# Patient Record
Sex: MALE | NOT HISPANIC OR LATINO | Employment: FULL TIME | URBAN - METROPOLITAN AREA
[De-identification: names, ages, dates, MRNs, and addresses within clinical notes are randomized per-mention and may not be internally consistent; named-entity substitution may affect disease eponyms.]

---

## 2023-03-07 DIAGNOSIS — E11.9 TYPE 2 DIABETES MELLITUS WITHOUT COMPLICATION, WITHOUT LONG-TERM CURRENT USE OF INSULIN (MULTI): Primary | ICD-10-CM

## 2023-03-12 PROBLEM — E11.9 DIABETES MELLITUS TYPE 2 WITHOUT RETINOPATHY (MULTI): Status: ACTIVE | Noted: 2023-03-12

## 2023-03-12 PROBLEM — H52.201 MYOPIA OF RIGHT EYE WITH ASTIGMATISM: Status: ACTIVE | Noted: 2023-03-12

## 2023-03-12 PROBLEM — E66.01 SEVERE OBESITY (BMI 35.0-39.9) WITH COMORBIDITY (MULTI): Status: ACTIVE | Noted: 2023-03-12

## 2023-03-12 PROBLEM — E55.9 VITAMIN D DEFICIENCY: Status: ACTIVE | Noted: 2023-03-12

## 2023-03-12 PROBLEM — F32.A ANXIETY AND DEPRESSION: Status: ACTIVE | Noted: 2023-03-12

## 2023-03-12 PROBLEM — H57.12 LEFT EYE PAIN: Status: ACTIVE | Noted: 2023-03-12

## 2023-03-12 PROBLEM — R07.89 ATYPICAL CHEST PAIN: Status: ACTIVE | Noted: 2023-03-12

## 2023-03-12 PROBLEM — H52.11 MYOPIA OF RIGHT EYE WITH ASTIGMATISM: Status: ACTIVE | Noted: 2023-03-12

## 2023-03-12 PROBLEM — L73.0 ACNE KELOIDALIS NUCHAE: Status: ACTIVE | Noted: 2023-03-12

## 2023-03-12 PROBLEM — R05.9 COUGH: Status: ACTIVE | Noted: 2023-03-12

## 2023-03-12 PROBLEM — H44.522 PHTHISIS BULBI OF LEFT EYE: Status: ACTIVE | Noted: 2023-03-12

## 2023-03-12 PROBLEM — F41.9 ANXIETY AND DEPRESSION: Status: ACTIVE | Noted: 2023-03-12

## 2023-03-12 PROBLEM — H17.9 LEFT CORNEA SCAR: Status: ACTIVE | Noted: 2023-03-12

## 2023-03-12 PROBLEM — J45.909 ASTHMA (HHS-HCC): Status: ACTIVE | Noted: 2023-03-12

## 2023-03-12 PROBLEM — Z86.69 HISTORY OF RETINAL DETACHMENT: Status: ACTIVE | Noted: 2023-03-12

## 2023-03-12 RX ORDER — BLOOD SUGAR DIAGNOSTIC
STRIP MISCELLANEOUS
COMMUNITY
End: 2023-04-28 | Stop reason: SDUPTHER

## 2023-03-12 RX ORDER — ALPRAZOLAM 0.5 MG/1
TABLET ORAL
COMMUNITY
Start: 2021-11-01 | End: 2023-06-16

## 2023-03-12 RX ORDER — METFORMIN HYDROCHLORIDE 500 MG/1
TABLET, EXTENDED RELEASE ORAL
COMMUNITY
End: 2023-09-22 | Stop reason: SDUPTHER

## 2023-03-12 RX ORDER — ERGOCALCIFEROL 1.25 MG/1
CAPSULE ORAL
COMMUNITY
Start: 2022-09-15 | End: 2023-12-12 | Stop reason: ALTCHOICE

## 2023-03-12 RX ORDER — ALBUTEROL SULFATE 90 UG/1
AEROSOL, METERED RESPIRATORY (INHALATION)
COMMUNITY
Start: 2022-08-03 | End: 2023-06-16

## 2023-03-12 RX ORDER — BLOOD-GLUCOSE CONTROL, NORMAL
EACH MISCELLANEOUS
COMMUNITY

## 2023-03-12 RX ORDER — PAROXETINE HYDROCHLORIDE 40 MG/1
40 TABLET, FILM COATED ORAL EVERY MORNING
COMMUNITY
Start: 2023-03-01 | End: 2023-06-07 | Stop reason: ENTERED-IN-ERROR

## 2023-03-12 RX ORDER — DULAGLUTIDE 0.75 MG/.5ML
INJECTION, SOLUTION SUBCUTANEOUS
COMMUNITY
End: 2023-04-13 | Stop reason: DRUGHIGH

## 2023-03-22 ENCOUNTER — TELEMEDICINE (OUTPATIENT)
Dept: PHARMACY | Facility: HOSPITAL | Age: 28
End: 2023-03-22
Payer: COMMERCIAL

## 2023-03-22 DIAGNOSIS — E11.9 TYPE 2 DIABETES MELLITUS WITHOUT COMPLICATION, WITHOUT LONG-TERM CURRENT USE OF INSULIN (MULTI): ICD-10-CM

## 2023-03-22 NOTE — PROGRESS NOTES
Subjective   Patient ID: Erasto Botello is a 27 y.o. male who presents for Diabetes.  Diabetes  He presents for his follow-up diabetic visit. He has type 2 diabetes mellitus. Current diabetic treatment includes oral agent (dual therapy) (Trulicity). An ACE inhibitor/angiotensin II receptor blocker is not being taken.       Referring Provider: MARCO A Dash-JENNIE     Objective     There were no vitals taken for this visit.     LAB  Lab Results   Component Value Date    BILITOT 0.4 07/21/2022    CALCIUM 9.9 07/21/2022    CO2 25 07/21/2022     07/21/2022    CREATININE 0.97 07/21/2022    GLUCOSE 98 07/21/2022    ALKPHOS 73 07/21/2022    K 4.1 07/21/2022    PROT 7.6 07/21/2022     07/21/2022    AST 27 07/21/2022    ALT 39 07/21/2022    BUN 13 07/21/2022    ANIONGAP 13 07/21/2022    MG 1.84 04/02/2021    PHOS 3.9 04/02/2021    ALBUMIN 4.7 07/21/2022    GFRMALE >90 07/21/2022     Lab Results   Component Value Date    TRIG 109 02/13/2019    CHOL 120 04/14/2021    HDL 26.4 (A) 04/14/2021     Lab Results   Component Value Date    HGBA1C 8.1 (A) 06/07/2022       Assessment/Plan     - Last PCP Visit: 2/2/23  - PMH of Pancreatitis: No  - PMH of Medullary Thyroid Cancer: No  - PMH of Urinary Tract Infections:  No    SECONDARY PREVENTION  - Statin? No  - ACE-I/ARB? No    Problem List Items Addressed This Visit          Endocrine/Metabolic     Reports Trulicity 0.75 mg no longer working, noticed home BG numbers have been rising. Would like to try 1.5 mg again, has 3 week supply at  home.     Previous discussion was had that Trulicity 0.75 mg has not shown efficacy in trials and is intended as a tolerability dose.     INCREASE Trulicity to 1.5 mg weekly   Continue Jardiance and Metformin          Type 2 diabetes mellitus without complication, without long-term current use of insulin (CMS/[facility])    Relevant Orders    Follow Up In Advanced [facility] - Pharmacy         Casi BandaD [facility]  Clinical  Pharmacist     Verbal consent to manage patient's drug therapy was obtained from [the patient and/or an individual authorized to act on behalf of a patient]. They were informed they may decline to participate or withdraw from participation in pharmacy services at any time.

## 2023-03-22 NOTE — ASSESSMENT & PLAN NOTE
Reports Trulicity 0.75 mg no longer working, noticed home BG numbers have been rising. Would like to try 1.5 mg again, has 3 week supply at  home.     INCREASE Trulicity to 1.5 mg weekly   Continue Jardiance and Metformin

## 2023-04-12 ENCOUNTER — TELEPHONE (OUTPATIENT)
Dept: PHARMACY | Facility: HOSPITAL | Age: 28
End: 2023-04-12
Payer: COMMERCIAL

## 2023-04-12 NOTE — TELEPHONE ENCOUNTER
Clinical Pharmacy was consulted for: Diabetes     Outreach attempts were made: 4/5, 4/11, 4/12    Pharmacy recommendations: Titrate Trulicity as tolerated      Please have patient contact Pharmacy Team for further assistance, if required.     Casi Hansen PharmD [facility]  Clinical Pharmacist

## 2023-04-13 ENCOUNTER — TELEMEDICINE (OUTPATIENT)
Dept: PHARMACY | Facility: HOSPITAL | Age: 28
End: 2023-04-13
Payer: COMMERCIAL

## 2023-04-13 DIAGNOSIS — E11.9 DIABETES MELLITUS TYPE 2 WITHOUT RETINOPATHY (MULTI): Primary | ICD-10-CM

## 2023-04-13 DIAGNOSIS — E11.9 DIABETES MELLITUS TYPE 2 WITHOUT RETINOPATHY (MULTI): ICD-10-CM

## 2023-04-13 RX ORDER — DULAGLUTIDE 3 MG/.5ML
3 INJECTION, SOLUTION SUBCUTANEOUS
Qty: 2 ML | Refills: 2 | Status: SHIPPED | OUTPATIENT
Start: 2023-04-13 | End: 2023-11-01 | Stop reason: DRUGHIGH

## 2023-04-13 NOTE — PROGRESS NOTES
"Subjective   Patient ID: Erasto Botello is a 27 y.o. male who presents for No chief complaint on file..  Diabetes  He presents for his follow-up diabetic visit. He has type 2 diabetes mellitus. His disease course has been worsening. Current diabetic treatment includes oral agent (monotherapy) (Trulicity). He is compliant with treatment most of the time. An ACE inhibitor/angiotensin II receptor blocker is not being taken.     Patient does not recall ever taking Jardiance. Reports only taking Metformin and Trulicity at this time. BG elevated recently and has been feeling \"woozy\".     Did not feel \"woozy' on Paroxetine 20 mg and does not feel the dose increase has been beneficial for anxiety. Advised patient to reschedule missed appointment with psychiatry. Has not tried an other SSRIs in the past. May benefit from alternative therapy due to poorly controlled anxiety and weight gain on paroxetine.     Referring Provider: Georgia Espinosa, MARCO A-CNP     Objective     There were no vitals taken for this visit.     LAB  Lab Results   Component Value Date    BILITOT 0.4 07/21/2022    CALCIUM 9.9 07/21/2022    CO2 25 07/21/2022     07/21/2022    CREATININE 0.97 07/21/2022    GLUCOSE 98 07/21/2022    ALKPHOS 73 07/21/2022    K 4.1 07/21/2022    PROT 7.6 07/21/2022     07/21/2022    AST 27 07/21/2022    ALT 39 07/21/2022    BUN 13 07/21/2022    ANIONGAP 13 07/21/2022    MG 1.84 04/02/2021    PHOS 3.9 04/02/2021    ALBUMIN 4.7 07/21/2022    GFRMALE >90 07/21/2022     Lab Results   Component Value Date    TRIG 109 02/13/2019    CHOL 120 04/14/2021    HDL 26.4 (A) 04/14/2021     Lab Results   Component Value Date    HGBA1C 8.1 (A) 06/07/2022       Assessment/Plan     - Last PCP Visit: 2/2/23  - PMH of Pancreatitis: No  - PMH of Medullary Thyroid Cancer: No  - PMH of Urinary Tract Infections:  No    SECONDARY PREVENTION  - Statin? No  - ACE-I/ARB? No    Problem List Items Addressed This Visit          " Endocrine/Metabolic    Diabetes mellitus type 2 without retinopathy (CMS/HCC)    Relevant Medications    dulaglutide (Trulicity) 3 mg/0.5 mL pen injector    Other Relevant Orders    Follow Up In Advanced Primary Care - Pharmacy         Casi Hansen PharmD [facility]  Clinical Pharmacist     Verbal consent to manage patient's drug therapy was obtained from the patient. They were informed they may decline to participate or withdraw from participation in pharmacy services at any time.

## 2023-04-13 NOTE — ASSESSMENT & PLAN NOTE
BG ranging around 180 mg/dL lately. Would like to increase Trulicity. Does report some trouble with adherence.     INCREASE Trulicity to 3 mg once weekly   CONTINUE Metformin ER 2 tablets twice daily

## 2023-04-14 LAB
ALANINE AMINOTRANSFERASE (SGPT) (U/L) IN SER/PLAS: 18 U/L (ref 10–52)
ALBUMIN (G/DL) IN SER/PLAS: 4.4 G/DL (ref 3.4–5)
ALBUMIN (MG/L) IN URINE: 9.6 MG/L
ALBUMIN/CREATININE (UG/MG) IN URINE: 3.5 UG/MG CRT (ref 0–30)
ALKALINE PHOSPHATASE (U/L) IN SER/PLAS: 75 U/L (ref 33–120)
ANION GAP IN SER/PLAS: 11 MMOL/L (ref 10–20)
ASPARTATE AMINOTRANSFERASE (SGOT) (U/L) IN SER/PLAS: 17 U/L (ref 9–39)
BILIRUBIN TOTAL (MG/DL) IN SER/PLAS: 0.5 MG/DL (ref 0–1.2)
CALCIDIOL (25 OH VITAMIN D3) (NG/ML) IN SER/PLAS: 15 NG/ML
CALCIUM (MG/DL) IN SER/PLAS: 10 MG/DL (ref 8.6–10.6)
CARBON DIOXIDE, TOTAL (MMOL/L) IN SER/PLAS: 28 MMOL/L (ref 21–32)
CHLORIDE (MMOL/L) IN SER/PLAS: 103 MMOL/L (ref 98–107)
CREATININE (MG/DL) IN SER/PLAS: 1.29 MG/DL (ref 0.5–1.3)
CREATININE (MG/DL) IN URINE: 271 MG/DL (ref 20–370)
ERYTHROCYTE DISTRIBUTION WIDTH (RATIO) BY AUTOMATED COUNT: 12.6 % (ref 11.5–14.5)
ERYTHROCYTE MEAN CORPUSCULAR HEMOGLOBIN CONCENTRATION (G/DL) BY AUTOMATED: 33.3 G/DL (ref 32–36)
ERYTHROCYTE MEAN CORPUSCULAR VOLUME (FL) BY AUTOMATED COUNT: 88 FL (ref 80–100)
ERYTHROCYTES (10*6/UL) IN BLOOD BY AUTOMATED COUNT: 5.41 X10E12/L (ref 4.5–5.9)
ESTIMATED AVERAGE GLUCOSE FOR HBA1C: 123 MG/DL
GFR MALE: 77 ML/MIN/1.73M2
GLUCOSE (MG/DL) IN SER/PLAS: 91 MG/DL (ref 74–99)
HEMATOCRIT (%) IN BLOOD BY AUTOMATED COUNT: 47.4 % (ref 41–52)
HEMOGLOBIN (G/DL) IN BLOOD: 15.8 G/DL (ref 13.5–17.5)
HEMOGLOBIN A1C/HEMOGLOBIN TOTAL IN BLOOD: 5.9 %
LEUKOCYTES (10*3/UL) IN BLOOD BY AUTOMATED COUNT: 12.2 X10E9/L (ref 4.4–11.3)
NRBC (PER 100 WBCS) BY AUTOMATED COUNT: 0 /100 WBC (ref 0–0)
PLATELETS (10*3/UL) IN BLOOD AUTOMATED COUNT: 230 X10E9/L (ref 150–450)
POTASSIUM (MMOL/L) IN SER/PLAS: 4.4 MMOL/L (ref 3.5–5.3)
PROTEIN TOTAL: 7.2 G/DL (ref 6.4–8.2)
SODIUM (MMOL/L) IN SER/PLAS: 138 MMOL/L (ref 136–145)
UREA NITROGEN (MG/DL) IN SER/PLAS: 13 MG/DL (ref 6–23)

## 2023-04-19 DIAGNOSIS — E55.9 VITAMIN D DEFICIENCY: Primary | ICD-10-CM

## 2023-04-19 RX ORDER — ERGOCALCIFEROL 1.25 MG/1
50000 CAPSULE ORAL
Qty: 12 CAPSULE | Refills: 0 | Status: SHIPPED | OUTPATIENT
Start: 2023-04-19 | End: 2023-06-07 | Stop reason: SDUPTHER

## 2023-04-28 ENCOUNTER — TELEMEDICINE (OUTPATIENT)
Dept: PHARMACY | Facility: HOSPITAL | Age: 28
End: 2023-04-28

## 2023-04-28 DIAGNOSIS — E11.9 DIABETES MELLITUS TYPE 2 WITHOUT RETINOPATHY (MULTI): ICD-10-CM

## 2023-04-28 RX ORDER — INSULIN PUMP SYRINGE, 3 ML
EACH MISCELLANEOUS
Qty: 1 EACH | Refills: 0 | Status: SHIPPED | OUTPATIENT
Start: 2023-04-28

## 2023-04-28 RX ORDER — BLOOD SUGAR DIAGNOSTIC
STRIP MISCELLANEOUS
Qty: 50 STRIP | Refills: 11 | Status: SHIPPED | OUTPATIENT
Start: 2023-04-28 | End: 2023-06-16

## 2023-04-28 NOTE — ASSESSMENT & PLAN NOTE
No HARRIS with Trulicity, 1 low BG. Interested in CGM    CONTINUE Trulicity 3 mg weekly   CONTINUE Metformin  mg - 2 tablets twice daily

## 2023-04-28 NOTE — PROGRESS NOTES
"Subjective   Patient ID: Erasto Botello is a 27 y.o. male who presents for No chief complaint on file..  Diabetes  He presents for his follow-up diabetic visit. He has type 2 diabetes mellitus. His disease course has been worsening. Current diabetic treatment includes oral agent (monotherapy) (Trulicity). He is compliant with treatment most of the time. An ACE inhibitor/angiotensin II receptor blocker is not being taken.     Patient has taken 1 dose of Trulicity 3 mg- states he ate at midnight and at 6:30 AM his BG was 64 mg/dL. Concern for accuracy of meter, patient states screen is \"messed up\". Interested in Freestyle Raisa.     Referring Provider: MARCO A Dash-JENNIE     Objective     There were no vitals taken for this visit.     LAB  Lab Results   Component Value Date    BILITOT 0.5 04/14/2023    CALCIUM 10.0 04/14/2023    CO2 28 04/14/2023     04/14/2023    CREATININE 1.29 04/14/2023    GLUCOSE 91 04/14/2023    ALKPHOS 75 04/14/2023    K 4.4 04/14/2023    PROT 7.2 04/14/2023     04/14/2023    AST 17 04/14/2023    ALT 18 04/14/2023    BUN 13 04/14/2023    ANIONGAP 11 04/14/2023    MG 1.84 04/02/2021    PHOS 3.9 04/02/2021    ALBUMIN 4.4 04/14/2023    GFRMALE 77 04/14/2023     Lab Results   Component Value Date    TRIG 109 02/13/2019    CHOL 120 04/14/2021    HDL 26.4 (A) 04/14/2021     Lab Results   Component Value Date    HGBA1C 5.9 (A) 04/14/2023       Assessment/Plan     - Last PCP Visit: 2/2/23  - PMH of Pancreatitis: No  - PMH of Medullary Thyroid Cancer: No  - PMH of Urinary Tract Infections:  No    SECONDARY PREVENTION  - Statin? No  - ACE-I/ARB? No    Problem List Items Addressed This Visit          Endocrine/Metabolic    Diabetes mellitus type 2 without retinopathy (CMS/HCC)     No HARRIS with Trulicity, 1 low BG. Interested in CGM    CONTINUE Trulicity 3 mg weekly   CONTINUE Metformin  mg - 2 tablets twice daily             FU with clinical pharmacy as needed - Freestyle " Raisa 3 sensors $91.10 per month. New manual glucometer sent at patient request.    Casi Hansen PharmD [facility]  Clinical Pharmacist     Verbal consent to manage patient's drug therapy was obtained from the patient. They were informed they may decline to participate or withdraw from participation in pharmacy services at any time.

## 2023-05-02 ENCOUNTER — TELEMEDICINE (OUTPATIENT)
Dept: PHARMACY | Facility: HOSPITAL | Age: 28
End: 2023-05-02

## 2023-05-02 DIAGNOSIS — E11.9 DIABETES MELLITUS TYPE 2 WITHOUT RETINOPATHY (MULTI): ICD-10-CM

## 2023-05-02 DIAGNOSIS — E11.9 DIABETES MELLITUS TYPE 2 WITHOUT RETINOPATHY (MULTI): Primary | ICD-10-CM

## 2023-05-02 NOTE — PROGRESS NOTES
Subjective   Patient ID: Erasto Botello is a 27 y.o. male who presents for No chief complaint on file..  Diabetes  He presents for his follow-up diabetic visit. He has type 2 diabetes mellitus. His disease course has been improving. There are no hypoglycemic associated symptoms. Current diabetic treatment includes oral agent (monotherapy) (Trulicity). He is compliant with treatment most of the time. An ACE inhibitor/angiotensin II receptor blocker is not being taken.     Patient continues to report BG in 60s in the morning without symptoms. States he eats something (usually meat or a starch) and less than 5 minutes later readings will be 100-130 mg/dL. Freestyle Raisa 3 is covered at ~90 per month.     Patient also expresses significant nausea with metformin.     Referring Provider: MARCO A Dash-CNP     Objective     There were no vitals taken for this visit.     LAB  Lab Results   Component Value Date    BILITOT 0.5 04/14/2023    CALCIUM 10.0 04/14/2023    CO2 28 04/14/2023     04/14/2023    CREATININE 1.29 04/14/2023    GLUCOSE 91 04/14/2023    ALKPHOS 75 04/14/2023    K 4.4 04/14/2023    PROT 7.2 04/14/2023     04/14/2023    AST 17 04/14/2023    ALT 18 04/14/2023    BUN 13 04/14/2023    ANIONGAP 11 04/14/2023    MG 1.84 04/02/2021    PHOS 3.9 04/02/2021    ALBUMIN 4.4 04/14/2023    GFRMALE 77 04/14/2023     Lab Results   Component Value Date    TRIG 109 02/13/2019    CHOL 120 04/14/2021    HDL 26.4 (A) 04/14/2021     Lab Results   Component Value Date    HGBA1C 5.9 (A) 04/14/2023       Assessment/Plan     - Last PCP Visit: 2/2/23  - PMH of Pancreatitis: No  - PMH of Medullary Thyroid Cancer: No  - PMH of Urinary Tract Infections:  No    SECONDARY PREVENTION  - Statin? No  - ACE-I/ARB? No    Problem List Items Addressed This Visit          Endocrine/Metabolic    Diabetes mellitus type 2 without retinopathy (CMS/HCC)       - Freestyle Raisa 3 sensors $91.10 per month.   - FU with pharmacy  2 weeks     Casi Hansen PharmD [facility]  Clinical Pharmacist     Verbal consent to manage patient's drug therapy was obtained from the patient. They were informed they may decline to participate or withdraw from participation in pharmacy services at any time.

## 2023-05-05 ENCOUNTER — TELEPHONE (OUTPATIENT)
Dept: PHARMACY | Facility: HOSPITAL | Age: 28
End: 2023-05-05

## 2023-05-05 NOTE — TELEPHONE ENCOUNTER
This patient called Casi Hansen PharmD today out of concern for low blood sugar readings without symptoms of hypoglycemia. I returned call for coverage as Casi was out of office today.    Patient reports SMBGs in the 60s (62, 66, etc.). Patient reports low readings typically occur before dinner and/or AM fasting. Patient reports no symptoms of shakiness, confusion, excessive hunger, or sweatiness. Patient reports his daily meal schedule is usually a small bowl of cereal around 8am, lunch at noon, and dinner late around 8-9pm. He does not usually snack.     Patient previously ordered new glucometer out of concern for accuracy of current meter. Patient reports the screen has a display issue but thinks the accuracy of readings is okay. Patient has not picked up the new meter (was sent to [employer]).    Plan:  Patient to  new meter to verify accuracy of glucose readings  Patient to try to have a snack in the afternoon to prevent pre-dinner low  Patient is scheduled for NP Francisca on 5/11/23 and Daily Lance on 5/19/23 and will maintain these appointments  Patient will call Casi if additional concerns prior to follow-up    Cheryl Garay PharmD   [phone]

## 2023-05-08 NOTE — TELEPHONE ENCOUNTER
I reviewed the progress note and agree with the resident’s findings and plans as written. Case discussed with resident.    Casi Hansen, SoloD

## 2023-06-07 ENCOUNTER — TELEMEDICINE (OUTPATIENT)
Dept: PRIMARY CARE | Facility: CLINIC | Age: 28
End: 2023-06-07
Payer: COMMERCIAL

## 2023-06-07 DIAGNOSIS — F41.9 ANXIETY: ICD-10-CM

## 2023-06-07 DIAGNOSIS — Z87.828 HISTORY OF MOTOR VEHICLE ACCIDENT: ICD-10-CM

## 2023-06-07 DIAGNOSIS — E11.9 DIABETES MELLITUS TYPE 2 WITHOUT RETINOPATHY (MULTI): Primary | ICD-10-CM

## 2023-06-07 DIAGNOSIS — E55.9 VITAMIN D DEFICIENCY: ICD-10-CM

## 2023-06-07 DIAGNOSIS — R79.89 ABNORMAL CBC: ICD-10-CM

## 2023-06-07 PROCEDURE — 99443 PR PHYS/QHP TELEPHONE EVALUATION 21-30 MIN: CPT | Performed by: NURSE PRACTITIONER

## 2023-06-07 RX ORDER — ERGOCALCIFEROL 1.25 MG/1
50000 CAPSULE ORAL
Qty: 12 CAPSULE | Refills: 0 | Status: SHIPPED | OUTPATIENT
Start: 2023-06-07 | End: 2023-06-07 | Stop reason: SDUPTHER

## 2023-06-07 RX ORDER — ERGOCALCIFEROL 1.25 MG/1
50000 CAPSULE ORAL
Qty: 12 CAPSULE | Refills: 0 | Status: SHIPPED | OUTPATIENT
Start: 2023-06-07 | End: 2023-06-16

## 2023-06-07 RX ORDER — DULAGLUTIDE 1.5 MG/.5ML
1.5 INJECTION, SOLUTION SUBCUTANEOUS
Qty: 2 ML | Refills: 3 | Status: SHIPPED | OUTPATIENT
Start: 2023-06-07 | End: 2023-12-19 | Stop reason: SDUPTHER

## 2023-06-07 RX ORDER — BLOOD SUGAR DIAGNOSTIC
STRIP MISCELLANEOUS
Qty: 100 STRIP | Refills: 3 | Status: SHIPPED | OUTPATIENT
Start: 2023-06-07 | End: 2024-03-27 | Stop reason: SDUPTHER

## 2023-06-07 RX ORDER — BLOOD SUGAR DIAGNOSTIC
STRIP MISCELLANEOUS
COMMUNITY
End: 2023-06-07 | Stop reason: SDUPTHER

## 2023-06-07 RX ORDER — CITALOPRAM 10 MG/1
TABLET ORAL
Qty: 30 TABLET | Refills: 1 | Status: SHIPPED | OUTPATIENT
Start: 2023-06-07 | End: 2023-11-09 | Stop reason: ALTCHOICE

## 2023-06-07 ASSESSMENT — ENCOUNTER SYMPTOMS
POLYPHAGIA: 0
POLYDIPSIA: 0
WEAKNESS: 0
BLACKOUTS: 0
DIZZINESS: 0
SEIZURES: 0
VISUAL CHANGE: 1
TREMORS: 0
WEIGHT LOSS: 0
SPEECH DIFFICULTY: 0
FATIGUE: 0
CONFUSION: 0
NERVOUS/ANXIOUS: 1
BLURRED VISION: 1
HEADACHES: 0
HUNGER: 0
SWEATS: 0
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
## 2023-06-16 ENCOUNTER — LAB (OUTPATIENT)
Dept: LAB | Facility: LAB | Age: 28
End: 2023-06-16
Payer: COMMERCIAL

## 2023-06-16 ENCOUNTER — TELEMEDICINE (OUTPATIENT)
Dept: PRIMARY CARE | Facility: CLINIC | Age: 28
End: 2023-06-16
Payer: COMMERCIAL

## 2023-06-16 DIAGNOSIS — R19.7 DIARRHEA OF PRESUMED INFECTIOUS ORIGIN: Primary | ICD-10-CM

## 2023-06-16 DIAGNOSIS — E11.9 DIABETES MELLITUS TYPE 2 WITHOUT RETINOPATHY (MULTI): ICD-10-CM

## 2023-06-16 DIAGNOSIS — R19.7 DIARRHEA OF PRESUMED INFECTIOUS ORIGIN: ICD-10-CM

## 2023-06-16 DIAGNOSIS — R79.89 ABNORMAL CBC: ICD-10-CM

## 2023-06-16 PROBLEM — F41.9 ANXIETY: Status: ACTIVE | Noted: 2023-06-16

## 2023-06-16 LAB
BASOPHILS (10*3/UL) IN BLOOD BY AUTOMATED COUNT: 0.02 X10E9/L (ref 0–0.1)
BASOPHILS/100 LEUKOCYTES IN BLOOD BY AUTOMATED COUNT: 0.2 % (ref 0–2)
EOSINOPHILS (10*3/UL) IN BLOOD BY AUTOMATED COUNT: 0.83 X10E9/L (ref 0–0.7)
EOSINOPHILS/100 LEUKOCYTES IN BLOOD BY AUTOMATED COUNT: 7.8 % (ref 0–6)
ERYTHROCYTE DISTRIBUTION WIDTH (RATIO) BY AUTOMATED COUNT: 12.4 % (ref 11.5–14.5)
ERYTHROCYTE MEAN CORPUSCULAR HEMOGLOBIN CONCENTRATION (G/DL) BY AUTOMATED: 33.1 G/DL (ref 32–36)
ERYTHROCYTE MEAN CORPUSCULAR VOLUME (FL) BY AUTOMATED COUNT: 86 FL (ref 80–100)
ERYTHROCYTES (10*6/UL) IN BLOOD BY AUTOMATED COUNT: 5.61 X10E12/L (ref 4.5–5.9)
HEMATOCRIT (%) IN BLOOD BY AUTOMATED COUNT: 48.4 % (ref 41–52)
HEMOGLOBIN (G/DL) IN BLOOD: 16 G/DL (ref 13.5–17.5)
IMMATURE GRANULOCYTES/100 LEUKOCYTES IN BLOOD BY AUTOMATED COUNT: 0.4 % (ref 0–0.9)
LEUKOCYTES (10*3/UL) IN BLOOD BY AUTOMATED COUNT: 10.6 X10E9/L (ref 4.4–11.3)
LYMPHOCYTES (10*3/UL) IN BLOOD BY AUTOMATED COUNT: 3.13 X10E9/L (ref 1.2–4.8)
LYMPHOCYTES/100 LEUKOCYTES IN BLOOD BY AUTOMATED COUNT: 29.5 % (ref 13–44)
MONOCYTES (10*3/UL) IN BLOOD BY AUTOMATED COUNT: 0.98 X10E9/L (ref 0.1–1)
MONOCYTES/100 LEUKOCYTES IN BLOOD BY AUTOMATED COUNT: 9.2 % (ref 2–10)
NEUTROPHILS (10*3/UL) IN BLOOD BY AUTOMATED COUNT: 5.61 X10E9/L (ref 1.2–7.7)
NEUTROPHILS/100 LEUKOCYTES IN BLOOD BY AUTOMATED COUNT: 52.9 % (ref 40–80)
NRBC (PER 100 WBCS) BY AUTOMATED COUNT: 0 /100 WBC (ref 0–0)
PLATELETS (10*3/UL) IN BLOOD AUTOMATED COUNT: 241 X10E9/L (ref 150–450)

## 2023-06-16 PROCEDURE — 99441 PR PHYS/QHP TELEPHONE EVALUATION 5-10 MIN: CPT | Performed by: FAMILY MEDICINE

## 2023-06-16 PROCEDURE — 36415 COLL VENOUS BLD VENIPUNCTURE: CPT

## 2023-06-16 PROCEDURE — 85025 COMPLETE CBC W/AUTO DIFF WBC: CPT

## 2023-06-16 PROCEDURE — 87506 IADNA-DNA/RNA PROBE TQ 6-11: CPT

## 2023-06-16 PROCEDURE — 83036 HEMOGLOBIN GLYCOSYLATED A1C: CPT

## 2023-06-16 RX ORDER — PROPRANOLOL HYDROCHLORIDE 20 MG/1
TABLET ORAL
COMMUNITY
Start: 2023-04-28 | End: 2023-11-09 | Stop reason: ALTCHOICE

## 2023-06-16 NOTE — PROGRESS NOTES
"Subjective   Patient ID: Erasto Botello is a 28 y.o. male who presents for Follow-up (Pt is following up from er visit, experiencing diarrhea. ).    HPI   5 days ate something \"wrong\"- was with beef ribs and chillie. The next day was with n/v/d  Went to ER at [facility]- was dx with gastroenteritis   Yesterday got better, but was with 5 episodes of Diarrha this am ( after eating broccoli with cheese)     Review of Systems  All systems reviewed and neg if not noted in the HPI above     Objective   There were no vitals taken for this visit.    Physical Exam  N/a  Assessment/Plan   Problem List Items Addressed This Visit    None  Visit Diagnoses       Diarrhea of presumed infectious origin    -  Primary    Relevant Orders    Cryptosporidium antigen, stool    Giardia antigen    Stool Culture, Test of Cure    C. difficile, PCR               " No

## 2023-06-16 NOTE — PATIENT INSTRUCTIONS
Diarrhea  - Avoid dairy  - Stay hydrated- ok to continue the ORS  - if you feel worse return to ER  - add probiotics  - ordered stool testing- but hold off today- can test tomorrow  if not better      Please follow up in as needed with pcp.       ** If labs or imaging ordered at today's visit, all the non-urgent results will be discussed at your next visit    If you have been referred for a special test or to a specialist please call  [phone] to schedule an appointment.  If you have any further questions, or if develop new or worsened symptoms, please give our office a call at [phone].

## 2023-06-17 ENCOUNTER — LAB (OUTPATIENT)
Dept: LAB | Facility: LAB | Age: 28
End: 2023-06-17
Payer: COMMERCIAL

## 2023-06-17 DIAGNOSIS — R19.7 DIARRHEA OF PRESUMED INFECTIOUS ORIGIN: ICD-10-CM

## 2023-06-17 LAB
CAMPYLOBACTER GP: NOT DETECTED
ESTIMATED AVERAGE GLUCOSE FOR HBA1C: 120 MG/DL
HEMOGLOBIN A1C/HEMOGLOBIN TOTAL IN BLOOD: 5.8 %
NOROVIRUS GI/GII: NOT DETECTED
ROTAVIRUS A: NOT DETECTED
SALMONELLA SP.: NOT DETECTED
SHIGA TOXIN 1: NOT DETECTED
SHIGA TOXIN 2: NOT DETECTED
SHIGELLA SP.: NOT DETECTED
VIBRIO GRP.: NOT DETECTED
YERSINIA ENTEROCOLITICA: NOT DETECTED

## 2023-06-17 PROCEDURE — 87493 C DIFF AMPLIFIED PROBE: CPT

## 2023-06-17 PROCEDURE — 87328 CRYPTOSPORIDIUM AG IA: CPT

## 2023-06-17 PROCEDURE — 87329 GIARDIA AG IA: CPT

## 2023-06-18 LAB — C. DIFFICILE TOXIN, PCR: NOT DETECTED

## 2023-06-22 LAB
CRYPTOSPORIDIUM ANTIGEN BY EIA: NEGATIVE
GIARDIA LAMBLIA AG: NEGATIVE

## 2023-08-11 ASSESSMENT — ENCOUNTER SYMPTOMS
WEIGHT LOSS: 0
SPEECH DIFFICULTY: 0
SWEATS: 0
POLYPHAGIA: 0
BLURRED VISION: 1
CONFUSION: 0
TREMORS: 0
VISUAL CHANGE: 1
BLACKOUTS: 0
WEAKNESS: 0
SEIZURES: 0
POLYDIPSIA: 0
FATIGUE: 0
NERVOUS/ANXIOUS: 1
DIZZINESS: 0
HEADACHES: 0
HUNGER: 0

## 2023-09-22 DIAGNOSIS — E11.9 TYPE 2 DIABETES MELLITUS WITHOUT COMPLICATION, WITHOUT LONG-TERM CURRENT USE OF INSULIN (MULTI): Primary | ICD-10-CM

## 2023-09-22 RX ORDER — METFORMIN HYDROCHLORIDE 500 MG/1
TABLET, EXTENDED RELEASE ORAL
Qty: 120 TABLET | Refills: 3 | Status: SHIPPED | OUTPATIENT
Start: 2023-09-22 | End: 2023-11-01 | Stop reason: SINTOL

## 2023-11-01 ENCOUNTER — TELEMEDICINE (OUTPATIENT)
Dept: PHARMACY | Facility: HOSPITAL | Age: 28
End: 2023-11-01

## 2023-11-01 DIAGNOSIS — E11.9 DIABETES MELLITUS TYPE 2 WITHOUT RETINOPATHY (MULTI): ICD-10-CM

## 2023-11-01 DIAGNOSIS — E11.9 DIABETES MELLITUS TYPE 2 WITHOUT RETINOPATHY (MULTI): Primary | ICD-10-CM

## 2023-11-01 NOTE — PROGRESS NOTES
Subjective     Patient ID: Erasto Botello is a 28 y.o. male who presents for Diabetes.    Referring Provider: ARIANA Dash     Diabetes  He presents for his follow-up diabetic visit. He has type 2 diabetes mellitus. His disease course has been stable.     Patient self-discontinued Metformin due tyo ongoing FBG in 70-80 mg/dL range. Patient admits he is testing BG more often than necessary due to anxiety. Testing 3-5 times daily. Would prefer a CGM.     Dexcom not covered by insurance: Will cost >$400   Raisa 3 is the most cost effective option as he anthony not need to purchase a read and can use his phone. This option is currently $94.49 every 28 days.     No Known Allergies    Objective     Current DM Pharmacotherapy:   Trulicity 1.5 mg/0.5 mL - once weekly     SECONDARY PREVENTION  - Statin? No  - ACE-I/ARB? No  - Aspirin? No    Current monitoring regimen:   Patient is using: glucometer    SMBG Readings: States they often run in 70-80 mg/dL range. Cites eating 6 pieces of [place] toast, juice, and other food. 1 hour later states BG was still below 100 mg/dL.     Any episodes of hypoglycemia? Yes  Hypoglycemia awareness? No - states episodes are asymptomatic     There were no vitals taken for this visit.    Pertinent PMH Review:  - PMH of Pancreatitis: No  - PMH/FH of Medullary Thyroid Cancer: No  - PMH of Retinopathy: No  - PMH of Urinary Tract Infections: No    Lab Review  Lab Results   Component Value Date    BILITOT 0.5 06/13/2023    CALCIUM 9.7 06/19/2023    CO2 26 06/19/2023     06/19/2023    CREATININE 1.2 06/19/2023    GLUCOSE 91 06/19/2023    ALKPHOS 78 06/13/2023    K 3.6 06/19/2023    PROT 8.0 (H) 06/13/2023     06/19/2023    AST 20 06/13/2023    ALT 19 06/13/2023    BUN 14 06/19/2023    ANIONGAP 11 06/19/2023    MG 1.8 06/13/2023    PHOS 3.9 04/02/2021    ALBUMIN 4.8 06/13/2023    GFRMALE 77 04/14/2023     Lab Results   Component Value Date    TRIG 109 02/13/2019    CHOL 120  04/14/2021    HDL 26.4 (A) 04/14/2021     Lab Results   Component Value Date    HGBA1C 5.8 (A) 06/16/2023     The ASCVD Risk score (Snoia EDWARDS, et al., 2019) failed to calculate for the following reasons:    The 2019 ASCVD risk score is only valid for ages 40 to 79      Assessment/Plan     Problem List Items Addressed This Visit       Diabetes mellitus type 2 without retinopathy (CMS/[facility])       Type 2 diabetes mellitus, is at goal. Goal A1C <7%     Follow up: I recommend diabetes care be as needed.     Casi Hansen PharmD [facility]  Clinical Pharmacist     Continue all meds under the continuation of care with the referring provider and clinical pharmacy team

## 2023-11-01 NOTE — Clinical Note
Fabiano Díaz, I know this patient is coming to see you tomorrow so I wanted to send this note your way.

## 2023-11-09 ENCOUNTER — OFFICE VISIT (OUTPATIENT)
Dept: PRIMARY CARE | Facility: CLINIC | Age: 28
End: 2023-11-09
Payer: COMMERCIAL

## 2023-11-09 ENCOUNTER — LAB (OUTPATIENT)
Dept: LAB | Facility: LAB | Age: 28
End: 2023-11-09
Payer: COMMERCIAL

## 2023-11-09 VITALS
DIASTOLIC BLOOD PRESSURE: 71 MMHG | WEIGHT: 295 LBS | BODY MASS INDEX: 36.87 KG/M2 | HEART RATE: 80 BPM | SYSTOLIC BLOOD PRESSURE: 123 MMHG | OXYGEN SATURATION: 97 %

## 2023-11-09 DIAGNOSIS — E55.9 MILD VITAMIN D DEFICIENCY: ICD-10-CM

## 2023-11-09 DIAGNOSIS — Z13.0 SCREENING FOR DEFICIENCY ANEMIA: ICD-10-CM

## 2023-11-09 DIAGNOSIS — Z13.220 LIPID SCREENING: ICD-10-CM

## 2023-11-09 DIAGNOSIS — E11.69 TYPE 2 DIABETES MELLITUS WITH OTHER SPECIFIED COMPLICATION, UNSPECIFIED WHETHER LONG TERM INSULIN USE (MULTI): ICD-10-CM

## 2023-11-09 DIAGNOSIS — F41.9 ANXIETY: Primary | ICD-10-CM

## 2023-11-09 LAB
25(OH)D3 SERPL-MCNC: 16 NG/ML (ref 30–100)
ALBUMIN SERPL BCP-MCNC: 4.7 G/DL (ref 3.4–5)
ALP SERPL-CCNC: 76 U/L (ref 33–120)
ALT SERPL W P-5'-P-CCNC: 16 U/L (ref 10–52)
ANION GAP SERPL CALC-SCNC: 14 MMOL/L (ref 10–20)
AST SERPL W P-5'-P-CCNC: 15 U/L (ref 9–39)
BILIRUB SERPL-MCNC: 0.4 MG/DL (ref 0–1.2)
BUN SERPL-MCNC: 15 MG/DL (ref 6–23)
CALCIUM SERPL-MCNC: 9.7 MG/DL (ref 8.6–10.6)
CHLORIDE SERPL-SCNC: 104 MMOL/L (ref 98–107)
CHOLEST SERPL-MCNC: 120 MG/DL (ref 0–199)
CHOLESTEROL/HDL RATIO: 4.5
CO2 SERPL-SCNC: 28 MMOL/L (ref 21–32)
CREAT SERPL-MCNC: 1.07 MG/DL (ref 0.5–1.3)
ERYTHROCYTE [DISTWIDTH] IN BLOOD BY AUTOMATED COUNT: 12.3 % (ref 11.5–14.5)
EST. AVERAGE GLUCOSE BLD GHB EST-MCNC: 123 MG/DL
GFR SERPL CREATININE-BSD FRML MDRD: >90 ML/MIN/1.73M*2
GLUCOSE SERPL-MCNC: 80 MG/DL (ref 74–99)
HBA1C MFR BLD: 5.9 %
HCT VFR BLD AUTO: 46.8 % (ref 41–52)
HDLC SERPL-MCNC: 26.8 MG/DL
HGB BLD-MCNC: 15.4 G/DL (ref 13.5–17.5)
MCH RBC QN AUTO: 28.7 PG (ref 26–34)
MCHC RBC AUTO-ENTMCNC: 32.9 G/DL (ref 32–36)
MCV RBC AUTO: 87 FL (ref 80–100)
NON-HDL CHOLESTEROL: 93 MG/DL (ref 0–149)
NRBC BLD-RTO: 0 /100 WBCS (ref 0–0)
PLATELET # BLD AUTO: 229 X10*3/UL (ref 150–450)
POTASSIUM SERPL-SCNC: 4.6 MMOL/L (ref 3.5–5.3)
PROT SERPL-MCNC: 7.5 G/DL (ref 6.4–8.2)
RBC # BLD AUTO: 5.37 X10*6/UL (ref 4.5–5.9)
SODIUM SERPL-SCNC: 141 MMOL/L (ref 136–145)
TSH SERPL-ACNC: 0.74 MIU/L (ref 0.44–3.98)
WBC # BLD AUTO: 11.8 X10*3/UL (ref 4.4–11.3)

## 2023-11-09 PROCEDURE — 3078F DIAST BP <80 MM HG: CPT | Performed by: STUDENT IN AN ORGANIZED HEALTH CARE EDUCATION/TRAINING PROGRAM

## 2023-11-09 PROCEDURE — 36415 COLL VENOUS BLD VENIPUNCTURE: CPT

## 2023-11-09 PROCEDURE — 83036 HEMOGLOBIN GLYCOSYLATED A1C: CPT

## 2023-11-09 PROCEDURE — 80053 COMPREHEN METABOLIC PANEL: CPT

## 2023-11-09 PROCEDURE — 99204 OFFICE O/P NEW MOD 45 MIN: CPT | Performed by: STUDENT IN AN ORGANIZED HEALTH CARE EDUCATION/TRAINING PROGRAM

## 2023-11-09 PROCEDURE — 85027 COMPLETE CBC AUTOMATED: CPT

## 2023-11-09 PROCEDURE — 3074F SYST BP LT 130 MM HG: CPT | Performed by: STUDENT IN AN ORGANIZED HEALTH CARE EDUCATION/TRAINING PROGRAM

## 2023-11-09 PROCEDURE — 82465 ASSAY BLD/SERUM CHOLESTEROL: CPT

## 2023-11-09 PROCEDURE — 82306 VITAMIN D 25 HYDROXY: CPT

## 2023-11-09 PROCEDURE — 84443 ASSAY THYROID STIM HORMONE: CPT

## 2023-11-09 PROCEDURE — 83718 ASSAY OF LIPOPROTEIN: CPT

## 2023-11-09 PROCEDURE — 3044F HG A1C LEVEL LT 7.0%: CPT | Performed by: STUDENT IN AN ORGANIZED HEALTH CARE EDUCATION/TRAINING PROGRAM

## 2023-11-09 RX ORDER — LANCETS 30 GAUGE
EACH MISCELLANEOUS
COMMUNITY
Start: 2023-05-28

## 2023-11-09 RX ORDER — CLINDAMYCIN PHOSPHATE 11.9 MG/ML
SOLUTION TOPICAL
COMMUNITY
Start: 2017-04-17

## 2023-11-09 RX ORDER — PAROXETINE 10 MG/1
TABLET, FILM COATED ORAL
Qty: 70 TABLET | Refills: 0 | Status: SHIPPED | OUTPATIENT
Start: 2023-11-09 | End: 2024-04-22 | Stop reason: SINTOL

## 2023-11-09 RX ORDER — PAROXETINE HYDROCHLORIDE 40 MG/1
40 TABLET, FILM COATED ORAL
COMMUNITY
Start: 2023-10-26 | End: 2023-11-09 | Stop reason: ALTCHOICE

## 2023-11-09 RX ORDER — BENZOYL PEROXIDE 50 MG/ML
LIQUID TOPICAL
COMMUNITY
Start: 2019-08-07

## 2023-11-09 NOTE — PATIENT INSTRUCTIONS
Labs in [address] today     Restart Paroxetine at 10mg once daily for two weeks, then 20mg daily until our follow up     Follow up with me in one month!    Best,  Dr. Díaz

## 2023-11-09 NOTE — PROGRESS NOTES
Erasto Botello is a 28 y.o. male seen in Clinic at /[facility] by Dr. Cricket Rosenthal and Dr. Cesar Díaz on 11/09/23 for routine care, as well as for management of the following chronic medical conditions: T2DM, anxiety, PTSD.   Regarding diabetes, he checks blood sugar at home 1-2x per day. States average is 110. Will occasionally have sugars of 70-80. Denies symptoms of hypoglycemia. No diaphoresis, tremors. Patient takes trulicity every Sunday, denies missed doses other this past Sunday. Previously on metformin, however self-discontinued a few months ago due to lower blood sugars. Denies nausea, vomiting, polydipsia, polyuria, tremors, chest pain, shortness of breath. Reports occasional blurred vision. Last optho eval 2 years ago per patient. Endorses occasional paresthesias of feet.    He also reports he went to urgent care on Monday due to episodes of feeling lightheaded. Reports his blood pressure decreased at visit when standing up. Restarted paroxetine at 40mg recently before episodes started. He had previously been on this dose but had been titrated from 10mg.    #T2DM  #Obesity   - follows with pharmacy   - last A1C 5.8% in 06/2023  - current regimen: Trulicity 1.5mg  - reassuring baseline renal function  - urine albumin reassuring in 04/2023  [  ] repeat labs today   [  ] last optho evaluation/visit - August, 2021. Due for exam.    #Anxiety   #PTSD  #Concern for OCD  - follows with psych   - current medication regimen: Paroxetine. However, he stopped taking recently due to concern it was contributing to orthostatic hypotension.  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    Ocular hypertension of left eye    Ocular laceration without prolapse or loss of intraocular tissue, left eye, initial encounter     Ruptured globe, left eye    Personal history of other diseases  of the nervous system and sense organs     History of retinal detachment    Personal history of other diseases of the respiratory system     Personal history of asthma    Unspecified astigmatism, unspecified eye 01/23/2018    Myopic astigmatism    Unspecified corneal scar and opacity 01/23/2018    Cornea scar     Subspecialty Medical Care: Psychiatry    Past Surgical History:   Past Surgical History:   Procedure Laterality Date    OTHER SURGICAL HISTORY  05/13/2014    Repair Of Rupture Of Eyeball     Surgery/Location/Date:     Medications:     Current Outpatient Medications:     benzoyl peroxide 5 % external wash, 1 Application, Disp: , Rfl:     clindamycin (Cleocin T) 1 % external solution, 1 Application, Disp: , Rfl:     OneTouch Ultra2 Meter misc, USE TO TEST BLOOD SUGAR TWICE DAILY, Disp: , Rfl:     dulaglutide (Trulicity) 1.5 mg/0.5 mL pen injector, Inject 1.5 mg under the skin 1 (one) time per week., Disp: 2 mL, Rfl: 3    ergocalciferol (Vitamin D-2) 1.25 MG (28491 UT) capsule, TAKE 1 CAPSULE twice monthly on the 15th and the 30 th for vitamin d deficiency, Disp: , Rfl:     FreeStyle glucose monitoring (OneTouch Ultra2 Meter) kit, USE TO TEST BLOOD SUGAR TWICE DAILY, Disp: 1 each, Rfl: 0    lancets 30 gauge misc, , Disp: , Rfl:     OneTouch Ultra Test strip, Test sugars twice daily, Disp: 100 strip, Rfl: 3    PARoxetine (Paxil) 10 mg tablet, Take 1 tablet (10 mg) by mouth once daily in the morning for 14 days, THEN 2 tablets (20 mg) once daily in the morning for 28 days., Disp: 70 tablet, Rfl: 0  -Does not take citalopram or vitamin D    Pharmacy: [facility]    Allergies:   No Known Allergies    Immunizations: Had flu vaccine    Family History:   Family History   Problem Relation Name Age of Onset    Anxiety disorder Mother      Depression Mother      Hypertension Mother      Other (embolism) Father      Diabetes Father      Breast cancer Paternal Grandmother      Lung cancer Paternal  Grandfather         Social History:   Home/Living Situation: Lives at home with daughter  Education: Diploma, some college  Employment/Work/Vocational: Works at a school  Activities: see family, play games  Drug Use: None  Alcohol: Occasional  Tobacco: Denies  Diet: Switches between health/unhealthy meals  Exercise: Walking at work  Sexuality: Currently sexually active with women.  Suicide/Depression/Anxiety: Has anxiety. No depression or suicide  Sleep: Sleeps well    Visit Vitals  /71   Pulse 80   Wt 134 kg (295 lb)   SpO2 97%   BMI 36.87 kg/m²   Smoking Status Never Assessed   BSA 2.66 m²        PHYSICAL EXAM:   General: well appearing, NAD, pleasant and engaged in encounter    HEENT: NCAT, MMM, opaque left eye  CV: RRR, no m/r/g  PULM: CTAB, non-labored respirations   ABD: soft, NT, ND, + bowel sounds   : no suprapubic or CVA tenderness   EXT: WWP, no significant edema   SKIN: no rashes noted   NEURO: A&Ox4, symmetric facies, no gross motor or sensory deficits, normal gait  PSYCH: pleasant mood, appropriate affect     Assessment/Plan    Erasto Botello is a 28 y.o. male seen in Clinic at /[facility] by Dr. Cesar Díaz on 11/09/23 for routine care, as well as for management of the following chronic medical conditions: T2DM, anxiety, PTSD. Diabetes well controlled on trulicity. Will recheck A1C today. Episodes of lightheadedness likely due to restarting paroxetine without titrating medication.       #T2DM  #Obesity   - follows with pharmacy   - last A1C 5.8% in 06/2023  - current regimen: Trulicity 1.5mg  - reassuring baseline renal function  - urine albumin reassuring in 04/2023  [  ] repeat labs today   [  ] last optho evaluation/visit - August, 2021. Due for exam.    #Anxiety   #PTSD  #Concern for OCD  - follows with psych   - current medication regimen: Paroxetine. However, he stopped taking recently due to concern it was contributing to orthostatic hypotension.  -Will restart at 10mg once daily x  2 weeks, then 20mg until follow up      #Health Maintenance   - Labs: CBC, CMP, Lipid panel, HgA1c, TSH, Vitamin D  - Vision, Hearing screens: deferred  - Counseling regarding alcohol/tobacco/drug use: not needed  - Depression screen: Negative  - BMI, Lipid, A1C screening and nutritional/exercise counseling: Lipid and Hga1c today  - Blood Pressure: WNL  - Safe Sexual Practices, STI, HIV screening: deferred  - Pap Smear (21 years and above): N/A      Return to clinic in 1 month for follow-up.     Patient seen, discussed and examined with Dr. Arjun Rosenthal MD  Internal Medicine-Pediatrics PGY1  Epic Chat    Cesar Díaz MD  Internal Medicine-Pediatrics   [facility] [address], [address]  P: [phone], F: [phone]

## 2023-11-10 ENCOUNTER — TELEPHONE (OUTPATIENT)
Dept: PHARMACY | Facility: HOSPITAL | Age: 28
End: 2023-11-10

## 2023-11-10 DIAGNOSIS — E11.9 TYPE 2 DIABETES MELLITUS WITHOUT COMPLICATION, WITHOUT LONG-TERM CURRENT USE OF INSULIN (MULTI): Primary | ICD-10-CM

## 2023-11-10 RX ORDER — BLOOD-GLUCOSE SENSOR
EACH MISCELLANEOUS
Qty: 2 EACH | Refills: 11 | Status: SHIPPED | OUTPATIENT
Start: 2023-11-10 | End: 2024-04-22

## 2023-11-10 NOTE — TELEPHONE ENCOUNTER
Patient has decided he would like to try Freestyle Raisa 3 sensor for testing BG. Will send Rx to preferred pharmacy Will send instruction videos on how to use. Patient instructed to call with any questions. Link to share data will be sent as well.     Casi Hansen PharmD [facility]  Clinical Pharmacist

## 2023-12-08 ENCOUNTER — APPOINTMENT (OUTPATIENT)
Dept: PRIMARY CARE | Facility: CLINIC | Age: 28
End: 2023-12-08

## 2023-12-12 ENCOUNTER — OFFICE VISIT (OUTPATIENT)
Dept: PRIMARY CARE | Facility: CLINIC | Age: 28
End: 2023-12-12
Payer: COMMERCIAL

## 2023-12-12 VITALS
DIASTOLIC BLOOD PRESSURE: 82 MMHG | HEART RATE: 86 BPM | SYSTOLIC BLOOD PRESSURE: 128 MMHG | WEIGHT: 290 LBS | BODY MASS INDEX: 36.06 KG/M2 | HEIGHT: 75 IN | OXYGEN SATURATION: 98 %

## 2023-12-12 DIAGNOSIS — J30.2 SEASONAL ALLERGIES: ICD-10-CM

## 2023-12-12 DIAGNOSIS — Z23 ENCOUNTER FOR IMMUNIZATION: ICD-10-CM

## 2023-12-12 DIAGNOSIS — E55.9 MILD VITAMIN D DEFICIENCY: ICD-10-CM

## 2023-12-12 DIAGNOSIS — Z23 IMMUNIZATION DUE: ICD-10-CM

## 2023-12-12 DIAGNOSIS — E11.69 TYPE 2 DIABETES MELLITUS WITH OTHER SPECIFIED COMPLICATION, UNSPECIFIED WHETHER LONG TERM INSULIN USE (MULTI): Primary | ICD-10-CM

## 2023-12-12 PROCEDURE — 3044F HG A1C LEVEL LT 7.0%: CPT | Performed by: STUDENT IN AN ORGANIZED HEALTH CARE EDUCATION/TRAINING PROGRAM

## 2023-12-12 PROCEDURE — 90715 TDAP VACCINE 7 YRS/> IM: CPT | Performed by: STUDENT IN AN ORGANIZED HEALTH CARE EDUCATION/TRAINING PROGRAM

## 2023-12-12 PROCEDURE — 3074F SYST BP LT 130 MM HG: CPT | Performed by: STUDENT IN AN ORGANIZED HEALTH CARE EDUCATION/TRAINING PROGRAM

## 2023-12-12 PROCEDURE — 3079F DIAST BP 80-89 MM HG: CPT | Performed by: STUDENT IN AN ORGANIZED HEALTH CARE EDUCATION/TRAINING PROGRAM

## 2023-12-12 PROCEDURE — 90471 IMMUNIZATION ADMIN: CPT | Performed by: STUDENT IN AN ORGANIZED HEALTH CARE EDUCATION/TRAINING PROGRAM

## 2023-12-12 PROCEDURE — 90677 PCV20 VACCINE IM: CPT | Performed by: STUDENT IN AN ORGANIZED HEALTH CARE EDUCATION/TRAINING PROGRAM

## 2023-12-12 PROCEDURE — 90472 IMMUNIZATION ADMIN EACH ADD: CPT | Performed by: STUDENT IN AN ORGANIZED HEALTH CARE EDUCATION/TRAINING PROGRAM

## 2023-12-12 PROCEDURE — 99214 OFFICE O/P EST MOD 30 MIN: CPT | Performed by: STUDENT IN AN ORGANIZED HEALTH CARE EDUCATION/TRAINING PROGRAM

## 2023-12-12 RX ORDER — CETIRIZINE HYDROCHLORIDE 10 MG/1
10 TABLET ORAL DAILY
Qty: 30 TABLET | Refills: 3 | Status: SHIPPED | OUTPATIENT
Start: 2023-12-12 | End: 2024-05-14 | Stop reason: WASHOUT

## 2023-12-12 RX ORDER — CHOLECALCIFEROL (VITAMIN D3) 50 MCG
50 TABLET ORAL DAILY
Qty: 90 TABLET | Refills: 3 | Status: SHIPPED | OUTPATIENT
Start: 2023-12-12 | End: 2024-12-06

## 2023-12-12 NOTE — PROGRESS NOTES
"Erasto Botello is a 28 y.o. male seen in Clinic at /[facility] by Dr. Adrian Wilson and Dr. Cesar Díaz on 12/12/23 for routine care, as well as for management of the following chronic medical conditions: T2DM, anxiety, PTSD.     Had a cold a few weeks ago, concerned about coughing. Has been lingering    Regarding diabetes, he checks blood sugar at home a few times per week, various times of day. States average is 110. Will occasionally have sugars of 70-80. Previously on metformin, however self-discontinued a few months ago due to lower blood sugars. Does report some concerns about sugar going low, when he has a number in low 100s before bed, will have a few oreos, french fries or pepsi because has concerns about going low. Says that he feels \"off\" whenever his sugar gets even below 100. Has never been <70 since taking Trulicit to 1.5. CGM discussed in past, but not interested at this time due to out of pocket costs.    Denies nausea, vomiting, polydipsia, polyuria, tremors, chest pain, shortness of breath. Reports occasional blurred vision. Last optho eval 2 years ago per patient. Endorses occasional paresthesias of feet.    In general, reports very poor diet, does not enjoy most fruits/veggies or healthier snack alternatives. Girlfriend also does bulk of grocery shopping and doesn't have to be too concerned about her weight.     At last visit, had reported some lightheadedness after suddenly restarting Paroxetine 40mg. At last visit had discussed gradual uptitration with 10mg for two weeks then 20mg then follow up. Picked up from pharmacy, but not yet started. Does continue to feel that he would benefit from resuming.    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    Ocular hypertension of left eye    Ocular laceration without prolapse or loss of intraocular tissue, left eye, initial encounter     Ruptured globe, left eye    Personal history of " other diseases of the nervous system and sense organs     History of retinal detachment    Personal history of other diseases of the respiratory system     Personal history of asthma    Unspecified astigmatism, unspecified eye 01/23/2018    Myopic astigmatism    Unspecified corneal scar and opacity 01/23/2018    Cornea scar     Subspecialty Medical Care: Psychiatry    Past Surgical History:   Past Surgical History:   Procedure Laterality Date    OTHER SURGICAL HISTORY  05/13/2014    Repair Of Rupture Of Eyeball     Surgery/Location/Date:     Medications:     Current Outpatient Medications:     benzoyl peroxide 5 % external wash, 1 Application, Disp: , Rfl:     blood-glucose sensor (FreeStyle Raisa 3 Sensor) device, Place 1 sensor every 14 days to test blood sugar., Disp: 2 each, Rfl: 11    cetirizine (ZyrTEC) 10 mg tablet, Take 1 tablet (10 mg) by mouth once daily., Disp: 30 tablet, Rfl: 3    cholecalciferol (Vitamin D3) 50 MCG (2000 UT) tablet, Take 1 tablet (50 mcg) by mouth once daily., Disp: 90 tablet, Rfl: 3    clindamycin (Cleocin T) 1 % external solution, 1 Application, Disp: , Rfl:     dulaglutide (Trulicity) 1.5 mg/0.5 mL pen injector, Inject 1.5 mg under the skin 1 (one) time per week., Disp: 2 mL, Rfl: 3    FreeStyle glucose monitoring (OneTouch Ultra2 Meter) kit, USE TO TEST BLOOD SUGAR TWICE DAILY, Disp: 1 each, Rfl: 0    lancets 30 gauge misc, , Disp: , Rfl:     OneTouch Ultra Test strip, Test sugars twice daily, Disp: 100 strip, Rfl: 3    OneTouch Ultra2 Meter misc, USE TO TEST BLOOD SUGAR TWICE DAILY, Disp: , Rfl:     PARoxetine (Paxil) 10 mg tablet, Take 1 tablet (10 mg) by mouth once daily in the morning for 14 days, THEN 2 tablets (20 mg) once daily in the morning for 28 days., Disp: 70 tablet, Rfl: 0  -Does not take citalopram or vitamin D    Pharmacy: [facility]    Allergies:   No Known Allergies    Immunizations:   - Flu UTD 2023  - Tdap unclear last   - Prevnar-20  "eligible     Family History:   Family History   Problem Relation Name Age of Onset    Anxiety disorder Mother      Depression Mother      Hypertension Mother      Other (embolism) Father      Diabetes Father      Breast cancer Paternal Grandmother      Lung cancer Paternal Grandfather       Social History:   Home/Living Situation: Lives at home with daughter  Education: Diploma, some college  Employment/Work/Vocational: Works at a school  Activities: see family, play games  Drug Use: None  Alcohol: Occasional  Tobacco: Denies  Diet: Switches between health/unhealthy meals  Exercise: Walking at work  Sexuality: Currently sexually active with women.  Suicide/Depression/Anxiety: Has anxiety. No depression or suicide  Sleep: Sleeps well    Visit Vitals  /82   Pulse 86   Ht 1.905 m (6' 3\")   Wt 132 kg (290 lb)   SpO2 98%   BMI 36.25 kg/m²   Smoking Status Never Assessed   BSA 2.64 m²      PHYSICAL EXAM:   General: well appearing AA male, NAD, pleasant and engaged in encounter    HEENT: NCAT, MMM, opaque left eye  CV: RRR, no m/r/g  PULM: CTAB, non-labored respirations   ABD: soft, NT, ND, + bowel sounds   : no suprapubic or CVA tenderness   EXT: WWP, no significant edema   SKIN: no rashes noted   NEURO: A&Ox4, symmetric facies, no gross motor or sensory deficits, normal gait  PSYCH: pleasant mood, appropriate affect     Assessment/Plan    Erasto Botello is a 28 y.o. male seen in Clinic at /[facility] by Dr. Cesar Díaz on 11/09/23 for routine care, as well as for management of the following chronic medical conditions: T2DM, anxiety, PTSD. Diabetes well controlled on trulicity. Episodes of lightheadedness resolved, likely due to restarting paroxetine without titrating medication.     #T2DM  #Obesity   - follows with pharmacy   - last A1C 5.9% in 11/2023  - current regimen: Trulicity 1.5mg  - reassuring baseline renal function  - urine albumin reassuring in 04/2023  [  ] dietician referral today  - did discuss " unlikelihood of true hypoglycemia, CGM offered but would like to hold off for now due to out of pocket costs  [  ] last optho evaluation/visit - August, 2021. Due for exam.    #Anxiety   #PTSD  #Concern for OCD  - follows with psych   - current medication regimen: Paroxetine. However, he stopped taking recently  - would still like to resume: Will restart at 10mg once daily x 2 weeks, then 20mg as previously planned    #Lipid Management   - cholesterol overall good but LDL in 90s   - defer statin for now given DM otherwise well controlled  - lifestyle modification, diet with lot of room for improvement  [ ] dietician referral today    #Cough  - likely 2/2 post-nasal drip and after URI  - no success with flonase in past, will trial cetirizine    #Vit D deficiency:  - 16 on check  - start 2000 international units  daily    #Health Maintenance   - Immunizations: Tdap and prevnar today, utd flu 2023  - Vision, Hearing screens: recommend following with optho given history   - Counseling regarding alcohol/tobacco/drug use: not needed  - Depression screen: anxiety as above   - BMI, Lipid, A1C screening and nutritional/exercise counseling: as above  - Blood Pressure: WNL  - Safe Sexual Practices, STI, HIV screening: deferred    Referrals: Nutrition, Tdap and PCV-20 immunizations today     Return to clinic for follow-up in 3 months with labs at that time.     Patient seen, discussed and examined with Dr. Arjun Wilson MD  PGY-4, Internal Medicine-Pediatrics    Cesar Díaz MD  Internal Medicine-Pediatrics   [facility] [address], [address]  P: [phone], F: [phone]

## 2023-12-12 NOTE — PATIENT INSTRUCTIONS
Try Zyrtec (Cetirizine) 10mg once daily before bedtime to help with allergy symptoms. Can also re-try Flonase if interested/willing.    Vitamin D script sent to your pharmacy.       Restart Paroxetine at 10mg once daily for two weeks, then 20mg daily.     Tetanus and Pnuemonia shots today.   Flu is up to date for 6235-7897 season.     Nutrition referral to discuss dietary habits more--referral number is [phone].    Follow up in 3 months! Call a few days before so we can get labs in the system for you prior to that visit.     Best,  Dr. Díaz

## 2023-12-19 DIAGNOSIS — E11.9 DIABETES MELLITUS TYPE 2 WITHOUT RETINOPATHY (MULTI): ICD-10-CM

## 2023-12-19 RX ORDER — DULAGLUTIDE 1.5 MG/.5ML
1.5 INJECTION, SOLUTION SUBCUTANEOUS
Qty: 6 ML | Refills: 1 | Status: SHIPPED | OUTPATIENT
Start: 2023-12-19 | End: 2024-01-26 | Stop reason: SDUPTHER

## 2023-12-19 NOTE — TELEPHONE ENCOUNTER
Patient called requesting 84 DS of Trulicity be sent to pharmacy. Sent in Rx to preferred pharmacy on file.     Casi BandaD [facility]  Clinical Pharmacist

## 2024-01-26 DIAGNOSIS — E11.9 DIABETES MELLITUS TYPE 2 WITHOUT RETINOPATHY (MULTI): ICD-10-CM

## 2024-01-26 RX ORDER — DULAGLUTIDE 1.5 MG/.5ML
1.5 INJECTION, SOLUTION SUBCUTANEOUS
Qty: 6 ML | Refills: 0 | Status: SHIPPED | OUTPATIENT
Start: 2024-01-26 | End: 2024-02-14 | Stop reason: DRUGHIGH

## 2024-01-26 NOTE — TELEPHONE ENCOUNTER
[facility] unable to order Trulicity 1.5 mg/0.5 mL - once weekly  - [facility] has in stock - pt to  there.     Casi Hansen PharmD [facility]  Clinical Pharmacy Specialist, Primary Care

## 2024-01-29 PROCEDURE — RXMED WILLOW AMBULATORY MEDICATION CHARGE

## 2024-01-30 ENCOUNTER — PHARMACY VISIT (OUTPATIENT)
Dept: PHARMACY | Facility: CLINIC | Age: 29
End: 2024-01-30
Payer: COMMERCIAL

## 2024-02-09 ENCOUNTER — APPOINTMENT (OUTPATIENT)
Dept: PRIMARY CARE | Facility: CLINIC | Age: 29
End: 2024-02-09
Payer: COMMERCIAL

## 2024-02-14 ENCOUNTER — TELEMEDICINE (OUTPATIENT)
Dept: PHARMACY | Facility: HOSPITAL | Age: 29
End: 2024-02-14
Payer: COMMERCIAL

## 2024-02-14 DIAGNOSIS — E11.9 TYPE 2 DIABETES MELLITUS WITHOUT COMPLICATION, WITHOUT LONG-TERM CURRENT USE OF INSULIN (MULTI): ICD-10-CM

## 2024-02-14 DIAGNOSIS — E11.9 TYPE 2 DIABETES MELLITUS WITHOUT COMPLICATION, WITHOUT LONG-TERM CURRENT USE OF INSULIN (MULTI): Primary | ICD-10-CM

## 2024-02-14 RX ORDER — DULAGLUTIDE 0.75 MG/.5ML
0.75 INJECTION, SOLUTION SUBCUTANEOUS
Qty: 6 ML | Refills: 0 | Status: SHIPPED | OUTPATIENT
Start: 2024-02-14 | End: 2024-03-28 | Stop reason: SDUPTHER

## 2024-02-14 NOTE — PROGRESS NOTES
Subjective     Patient ID: Erasto Botello is a 28 y.o. male who presents for Diabetes.    Referring Provider: Cesar Díaz MD     Diabetes  He presents for his follow-up diabetic visit. He has type 2 diabetes mellitus. His disease course has been stable.     Dexcom not covered by insurance: Will cost >$400   Raisa 3 is the most cost effective option as he anthony not need to purchase a read and can use his phone. This option is currently $94.49 every 28 days.     Trulicity 1.5 mg on backorder.     No Known Allergies    Objective     Current DM Pharmacotherapy:   Trulicity 1.5 mg/0.5 mL - once weekly     SECONDARY PREVENTION  - Statin? No  - ACE-I/ARB? No  - Aspirin? No    Current monitoring regimen:   Patient is using: glucometer    SMBG Readings: 80-90 mg/dL when he does check     Any episodes of hypoglycemia? Yes  Hypoglycemia awareness? No - states episodes are asymptomatic     There were no vitals taken for this visit.    Pertinent PMH Review:  - PMH of Pancreatitis: No  - PMH/FH of Medullary Thyroid Cancer: No  - PMH of Retinopathy: No  - PMH of Urinary Tract Infections: No    Lab Review  Lab Results   Component Value Date    BILITOT 0.4 11/09/2023    CALCIUM 9.7 11/09/2023    CO2 28 11/09/2023     11/09/2023    CREATININE 1.07 11/09/2023    GLUCOSE 80 11/09/2023    ALKPHOS 76 11/09/2023    K 4.6 11/09/2023    PROT 7.5 11/09/2023     11/09/2023    AST 15 11/09/2023    ALT 16 11/09/2023    BUN 15 11/09/2023    ANIONGAP 14 11/09/2023    MG 1.8 06/13/2023    PHOS 3.9 04/02/2021    ALBUMIN 4.7 11/09/2023    GFRMALE 77 04/14/2023     Lab Results   Component Value Date    TRIG 109 02/13/2019    CHOL 120 11/09/2023    HDL 26.8 11/09/2023     Lab Results   Component Value Date    HGBA1C 5.9 (H) 11/09/2023     The ASCVD Risk score (Sonia DK, et al., 2019) failed to calculate for the following reasons:    The 2019 ASCVD risk score is only valid for ages 40 to 79      Assessment/Plan     Problem List  Items Addressed This Visit       Type 2 diabetes mellitus without complication, without long-term current use of insulin (CMS/[facility])    Relevant Medications    dulaglutide (Trulicity) 0.75 mg/0.5 mL pen injector       Type 2 diabetes mellitus, is at goal. Goal A1C <7%     Follow up: I recommend diabetes care be as needed.     Casi BandaD [facility]  Clinical Pharmacist     Continue all meds under the continuation of care with the referring provider and clinical pharmacy team

## 2024-02-28 DIAGNOSIS — F41.9 ANXIETY: ICD-10-CM

## 2024-02-28 DIAGNOSIS — E11.9 TYPE 2 DIABETES MELLITUS WITHOUT COMPLICATION, WITHOUT LONG-TERM CURRENT USE OF INSULIN (MULTI): Primary | ICD-10-CM

## 2024-02-28 RX ORDER — PAROXETINE HYDROCHLORIDE 20 MG/1
20 TABLET, FILM COATED ORAL EVERY MORNING
COMMUNITY
End: 2024-02-28 | Stop reason: SDUPTHER

## 2024-02-28 RX ORDER — DULAGLUTIDE 1.5 MG/.5ML
1.5 INJECTION, SOLUTION SUBCUTANEOUS
Qty: 2 ML | Refills: 1 | Status: SHIPPED | OUTPATIENT
Start: 2024-02-28 | End: 2024-03-28

## 2024-02-28 RX ORDER — PAROXETINE HYDROCHLORIDE 20 MG/1
20 TABLET, FILM COATED ORAL EVERY MORNING
Qty: 90 TABLET | Refills: 1 | Status: SHIPPED | OUTPATIENT
Start: 2024-02-28 | End: 2024-04-22 | Stop reason: SINTOL

## 2024-02-28 RX ORDER — PAROXETINE 10 MG/1
TABLET, FILM COATED ORAL
Qty: 70 TABLET | Refills: 0 | OUTPATIENT
Start: 2024-02-28

## 2024-02-28 NOTE — TELEPHONE ENCOUNTER
Patient's pharmacy says they can order Trulicity 1.5 mg again, will leave both 1.5 mg and 0.75 mg active so pharmacy can order whichever is available.    Casi BandaD [facility]  Clinical Pharmacy Specialist, Primary Care

## 2024-03-27 DIAGNOSIS — E11.9 DIABETES MELLITUS TYPE 2 WITHOUT RETINOPATHY (MULTI): ICD-10-CM

## 2024-03-27 RX ORDER — BLOOD SUGAR DIAGNOSTIC
STRIP MISCELLANEOUS
Qty: 100 STRIP | Refills: 3 | Status: SHIPPED | OUTPATIENT
Start: 2024-03-27 | End: 2024-03-28 | Stop reason: SDUPTHER

## 2024-03-28 ENCOUNTER — TELEMEDICINE (OUTPATIENT)
Dept: PHARMACY | Facility: HOSPITAL | Age: 29
End: 2024-03-28
Payer: COMMERCIAL

## 2024-03-28 DIAGNOSIS — E11.9 DIABETES MELLITUS TYPE 2 WITHOUT RETINOPATHY (MULTI): ICD-10-CM

## 2024-03-28 DIAGNOSIS — E11.9 TYPE 2 DIABETES MELLITUS WITHOUT COMPLICATION, WITHOUT LONG-TERM CURRENT USE OF INSULIN (MULTI): ICD-10-CM

## 2024-03-28 RX ORDER — DULAGLUTIDE 0.75 MG/.5ML
0.75 INJECTION, SOLUTION SUBCUTANEOUS
Qty: 6 ML | Refills: 1 | Status: SHIPPED | OUTPATIENT
Start: 2024-03-28 | End: 2024-05-01 | Stop reason: DRUGHIGH

## 2024-03-28 RX ORDER — BLOOD SUGAR DIAGNOSTIC
STRIP MISCELLANEOUS
Qty: 100 STRIP | Refills: 11 | Status: SHIPPED | OUTPATIENT
Start: 2024-03-28

## 2024-03-28 NOTE — PROGRESS NOTES
Subjective     Patient ID: Erasto Botello is a 28 y.o. male who presents for No chief complaint on file..    Referring Provider: Cesar Díaz MD     Diabetes  He presents for his follow-up diabetic visit. He has type 2 diabetes mellitus. His disease course has been stable.     Dexcom not covered by insurance: Will cost >$400   Raisa 3 is the most cost effective option as he anthony not need to purchase a read and can use his phone. This option is currently $94.49 every 28 days.     Trulicity 1.5 mg on backorder. States BG has been higher than normal but highest readings have been 150-160 mg/dL and do eventually come back down. Currently running low on test strips, currently in between insurances. Advised to compare cash price of one touch test strips to [employer] machines and test strips.     No Known Allergies    Objective     Current DM Pharmacotherapy:   Trulicity 0.75 mg/0.5 mL - once weekly     SECONDARY PREVENTION  - Statin? No  - ACE-I/ARB? No  - Aspirin? No    Current monitoring regimen:   Patient is using: glucometer    SMBG Readings: Max 150-160 mg/dL     Any episodes of hypoglycemia? Yes  Hypoglycemia awareness? No - states episodes are asymptomatic     There were no vitals taken for this visit.    Pertinent PMH Review:  - PMH of Pancreatitis: No  - PMH/FH of Medullary Thyroid Cancer: No  - PMH of Retinopathy: No  - PMH of Urinary Tract Infections: No    Lab Review  Lab Results   Component Value Date    BILITOT 0.4 11/09/2023    CALCIUM 9.7 11/09/2023    CO2 28 11/09/2023     11/09/2023    CREATININE 1.07 11/09/2023    GLUCOSE 80 11/09/2023    ALKPHOS 76 11/09/2023    K 4.6 11/09/2023    PROT 7.5 11/09/2023     11/09/2023    AST 15 11/09/2023    ALT 16 11/09/2023    BUN 15 11/09/2023    ANIONGAP 14 11/09/2023    MG 1.8 06/13/2023    PHOS 3.9 04/02/2021    ALBUMIN 4.7 11/09/2023    GFRMALE 77 04/14/2023     Lab Results   Component Value Date    TRIG 109 02/13/2019    CHOL 120  11/09/2023    HDL 26.8 11/09/2023     Lab Results   Component Value Date    HGBA1C 5.9 (H) 11/09/2023     The ASCVD Risk score (Sonia EDWARDS, et al., 2019) failed to calculate for the following reasons:    The 2019 ASCVD risk score is only valid for ages 40 to 79      Assessment/Plan     Problem List Items Addressed This Visit       Diabetes mellitus type 2 without retinopathy (CMS/[facility])       Type 2 diabetes mellitus, is at goal. Goal A1C <7%     Follow up: I recommend diabetes care be as needed.     Casi Hansen PharmD [facility]  Clinical Pharmacist     Continue all meds under the continuation of care with the referring provider and clinical pharmacy team

## 2024-04-12 ENCOUNTER — HOSPITAL ENCOUNTER (EMERGENCY)
Facility: HOSPITAL | Age: 29
Discharge: HOME | End: 2024-04-12
Attending: FAMILY MEDICINE
Payer: COMMERCIAL

## 2024-04-12 ENCOUNTER — APPOINTMENT (OUTPATIENT)
Dept: CARDIOLOGY | Facility: HOSPITAL | Age: 29
End: 2024-04-12
Payer: COMMERCIAL

## 2024-04-12 VITALS
HEIGHT: 75 IN | DIASTOLIC BLOOD PRESSURE: 90 MMHG | TEMPERATURE: 97.5 F | WEIGHT: 297.62 LBS | HEART RATE: 70 BPM | BODY MASS INDEX: 37.01 KG/M2 | SYSTOLIC BLOOD PRESSURE: 128 MMHG | RESPIRATION RATE: 17 BRPM | OXYGEN SATURATION: 98 %

## 2024-04-12 DIAGNOSIS — R42 LIGHTHEADEDNESS: Primary | ICD-10-CM

## 2024-04-12 DIAGNOSIS — R42 DIZZINESS: ICD-10-CM

## 2024-04-12 DIAGNOSIS — R31.9 HEMATURIA, UNSPECIFIED TYPE: ICD-10-CM

## 2024-04-12 DIAGNOSIS — E11.65 HYPERGLYCEMIA DUE TO DIABETES MELLITUS (MULTI): ICD-10-CM

## 2024-04-12 DIAGNOSIS — F41.9 ANXIETY: ICD-10-CM

## 2024-04-12 LAB
ALBUMIN SERPL BCP-MCNC: 4.6 G/DL (ref 3.4–5)
ALP SERPL-CCNC: 69 U/L (ref 33–120)
ALT SERPL W P-5'-P-CCNC: 20 U/L (ref 10–52)
ANION GAP SERPL CALC-SCNC: 11 MMOL/L (ref 10–20)
APPEARANCE UR: CLEAR
AST SERPL W P-5'-P-CCNC: 17 U/L (ref 9–39)
ATRIAL RATE: 75 BPM
BASOPHILS # BLD AUTO: 0.04 X10*3/UL (ref 0–0.1)
BASOPHILS NFR BLD AUTO: 0.4 %
BILIRUB SERPL-MCNC: 0.7 MG/DL (ref 0–1.2)
BILIRUB UR STRIP.AUTO-MCNC: NEGATIVE MG/DL
BUN SERPL-MCNC: 12 MG/DL (ref 6–23)
CALCIUM SERPL-MCNC: 9.8 MG/DL (ref 8.6–10.3)
CHLORIDE SERPL-SCNC: 102 MMOL/L (ref 98–107)
CO2 SERPL-SCNC: 29 MMOL/L (ref 21–32)
COLOR UR: YELLOW
CREAT SERPL-MCNC: 1.03 MG/DL (ref 0.5–1.3)
EGFRCR SERPLBLD CKD-EPI 2021: >90 ML/MIN/1.73M*2
EOSINOPHIL # BLD AUTO: 0.09 X10*3/UL (ref 0–0.7)
EOSINOPHIL NFR BLD AUTO: 0.9 %
ERYTHROCYTE [DISTWIDTH] IN BLOOD BY AUTOMATED COUNT: 12.1 % (ref 11.5–14.5)
GLUCOSE BLD MANUAL STRIP-MCNC: 98 MG/DL (ref 74–99)
GLUCOSE SERPL-MCNC: 102 MG/DL (ref 74–99)
GLUCOSE UR STRIP.AUTO-MCNC: NEGATIVE MG/DL
HCT VFR BLD AUTO: 50.6 % (ref 41–52)
HGB BLD-MCNC: 16.9 G/DL (ref 13.5–17.5)
HOLD SPECIMEN: NORMAL
IMM GRANULOCYTES # BLD AUTO: 0.02 X10*3/UL (ref 0–0.7)
IMM GRANULOCYTES NFR BLD AUTO: 0.2 % (ref 0–0.9)
KETONES UR STRIP.AUTO-MCNC: NEGATIVE MG/DL
LEUKOCYTE ESTERASE UR QL STRIP.AUTO: NEGATIVE
LYMPHOCYTES # BLD AUTO: 2.63 X10*3/UL (ref 1.2–4.8)
LYMPHOCYTES NFR BLD AUTO: 27.1 %
MCH RBC QN AUTO: 28.4 PG (ref 26–34)
MCHC RBC AUTO-ENTMCNC: 33.4 G/DL (ref 32–36)
MCV RBC AUTO: 85 FL (ref 80–100)
MONOCYTES # BLD AUTO: 0.69 X10*3/UL (ref 0.1–1)
MONOCYTES NFR BLD AUTO: 7.1 %
NEUTROPHILS # BLD AUTO: 6.22 X10*3/UL (ref 1.2–7.7)
NEUTROPHILS NFR BLD AUTO: 64.3 %
NITRITE UR QL STRIP.AUTO: NEGATIVE
NRBC BLD-RTO: ABNORMAL /100{WBCS}
P AXIS: 29 DEGREES
P OFFSET: 210 MS
P ONSET: 157 MS
PH UR STRIP.AUTO: 7 [PH]
PLATELET # BLD AUTO: 193 X10*3/UL (ref 150–450)
POTASSIUM SERPL-SCNC: 3.9 MMOL/L (ref 3.5–5.3)
PR INTERVAL: 138 MS
PROT SERPL-MCNC: 7.9 G/DL (ref 6.4–8.2)
PROT UR STRIP.AUTO-MCNC: NEGATIVE MG/DL
Q ONSET: 226 MS
QRS COUNT: 12 BEATS
QRS DURATION: 78 MS
QT INTERVAL: 336 MS
QTC CALCULATION(BAZETT): 375 MS
QTC FREDERICIA: 361 MS
R AXIS: 29 DEGREES
RBC # BLD AUTO: 5.95 X10*6/UL (ref 4.5–5.9)
RBC # UR STRIP.AUTO: ABNORMAL /UL
RBC #/AREA URNS AUTO: NORMAL /HPF
SODIUM SERPL-SCNC: 138 MMOL/L (ref 136–145)
SP GR UR STRIP.AUTO: 1.01
SQUAMOUS #/AREA URNS AUTO: NORMAL /HPF
T AXIS: 48 DEGREES
T OFFSET: 394 MS
UROBILINOGEN UR STRIP.AUTO-MCNC: 0.2 MG/DL
VENTRICULAR RATE: 75 BPM
WBC # BLD AUTO: 9.7 X10*3/UL (ref 4.4–11.3)
WBC #/AREA URNS AUTO: NORMAL /HPF

## 2024-04-12 PROCEDURE — 84075 ASSAY ALKALINE PHOSPHATASE: CPT | Performed by: FAMILY MEDICINE

## 2024-04-12 PROCEDURE — 99283 EMERGENCY DEPT VISIT LOW MDM: CPT

## 2024-04-12 PROCEDURE — 36415 COLL VENOUS BLD VENIPUNCTURE: CPT | Performed by: FAMILY MEDICINE

## 2024-04-12 PROCEDURE — 81001 URINALYSIS AUTO W/SCOPE: CPT | Performed by: FAMILY MEDICINE

## 2024-04-12 PROCEDURE — 85025 COMPLETE CBC W/AUTO DIFF WBC: CPT | Performed by: FAMILY MEDICINE

## 2024-04-12 PROCEDURE — 82947 ASSAY GLUCOSE BLOOD QUANT: CPT | Mod: 59

## 2024-04-12 PROCEDURE — 82565 ASSAY OF CREATININE: CPT | Performed by: FAMILY MEDICINE

## 2024-04-12 PROCEDURE — 93005 ELECTROCARDIOGRAM TRACING: CPT

## 2024-04-12 PROCEDURE — 82947 ASSAY GLUCOSE BLOOD QUANT: CPT

## 2024-04-12 RX ORDER — METFORMIN HYDROCHLORIDE 500 MG/1
500 TABLET ORAL
COMMUNITY
End: 2024-05-23 | Stop reason: ALTCHOICE

## 2024-04-12 RX ORDER — MECLIZINE HYDROCHLORIDE 25 MG/1
25 TABLET ORAL 3 TIMES DAILY PRN
Qty: 30 TABLET | Refills: 0 | Status: SHIPPED | OUTPATIENT
Start: 2024-04-12 | End: 2024-04-22

## 2024-04-12 RX ORDER — HYDROXYZINE HYDROCHLORIDE 25 MG/1
25 TABLET, FILM COATED ORAL EVERY 8 HOURS PRN
Qty: 30 TABLET | Refills: 0 | Status: SHIPPED | OUTPATIENT
Start: 2024-04-12 | End: 2024-05-14 | Stop reason: ALTCHOICE

## 2024-04-12 ASSESSMENT — COLUMBIA-SUICIDE SEVERITY RATING SCALE - C-SSRS
2. HAVE YOU ACTUALLY HAD ANY THOUGHTS OF KILLING YOURSELF?: NO
1. IN THE PAST MONTH, HAVE YOU WISHED YOU WERE DEAD OR WISHED YOU COULD GO TO SLEEP AND NOT WAKE UP?: NO
6. HAVE YOU EVER DONE ANYTHING, STARTED TO DO ANYTHING, OR PREPARED TO DO ANYTHING TO END YOUR LIFE?: NO

## 2024-04-12 ASSESSMENT — PAIN - FUNCTIONAL ASSESSMENT: PAIN_FUNCTIONAL_ASSESSMENT: 0-10

## 2024-04-12 ASSESSMENT — PAIN SCALES - GENERAL
PAINLEVEL_OUTOF10: 0 - NO PAIN

## 2024-04-12 NOTE — DISCHARGE INSTRUCTIONS
You were seen today for several day history of dizziness, lightheadedness, just not feeling right.    Your workup today is unremarkable including CBC, CMP, and urinalysis.  Your UA only showed trace blood.  This should be follow-up with your PCP.    We discussed the importance of getting a good night sleep, eating right and routine exercise.    Prescriptions for meclizine (for dizziness) and hydroxyzine (for episodic anxiety) have been forwarded to your pharmacy.    Follow-up with your PCP in 7 to 10 days.

## 2024-04-12 NOTE — ED PROVIDER NOTES
"HPI   Chief Complaint   Patient presents with    Dizziness     Male patient presents to the ED with complaints of feeling lightheaded and dizzy for the past 2-3 days. He denies having any chest pain, palpitations, urinary changes, cough, fever, chills, n/v/d. He states he is diabetic and his sugars have been around 120. Upon triage his BS is 98. He complains of having increased anxiety recently with panic attacks. He states he feels safe at home, no SI/HI       28-year-old type II diabetic-controlled, with general anxiety disorder.    Patient presents with dizziness, lightheadedness, and \"not feeling calibrated\" for the last 2 days.  He describes a certain \"wooziness.\"  He admits to a recent panic attack and pain in his right lower extremity which woke him from sleep and forced him to squeeze his calf.  States he gets occasional shortness of breath, and has had poor sleep over the last 3 nights.        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    Ocular hypertension of left eye    Ocular laceration without prolapse or loss of intraocular tissue, left eye, initial encounter     Ruptured globe, left eye    Personal history of other diseases of the nervous system and sense organs     History of retinal detachment    Personal history of other diseases of the respiratory system     Personal history of asthma    Unspecified astigmatism, unspecified eye 01/23/2018    Myopic astigmatism    Unspecified corneal scar and opacity 01/23/2018    Cornea scar     Past Surgical History:   Procedure Laterality Date    OTHER SURGICAL HISTORY  05/13/2014    Repair Of Rupture Of Eyeball     Family History   Problem Relation Name Age of Onset    Anxiety disorder Mother      Depression Mother      Hypertension Mother      Other (embolism) Father      Diabetes " Father      Breast cancer Paternal Grandmother      Lung cancer Paternal Grandfather       Social History     Tobacco Use    Smoking status: Never    Smokeless tobacco: Never   Vaping Use    Vaping status: Never Used   Substance Use Topics    Alcohol use: Never    Drug use: Never       Physical Exam   ED Triage Vitals [04/12/24 1010]   Temperature Heart Rate Respirations BP   36.4 °C (97.5 °F) 71 16 135/88      Pulse Ox Temp Source Heart Rate Source Patient Position   98 % Temporal Monitor Sitting      BP Location FiO2 (%)     Left arm --       Physical Exam  Vitals and nursing note reviewed.   Constitutional:       Appearance: Normal appearance. He is obese.   HENT:      Head: Normocephalic.   Eyes:      Extraocular Movements: Extraocular movements intact.      Conjunctiva/sclera: Conjunctivae normal.   Cardiovascular:      Rate and Rhythm: Normal rate and regular rhythm.      Heart sounds: Normal heart sounds.   Pulmonary:      Breath sounds: Normal breath sounds.   Abdominal:      Comments: Really   Musculoskeletal:      Cervical back: Neck supple.   Neurological:      Mental Status: He is alert.         ED Course & MDM   Diagnoses as of 04/17/24 1141   Lightheadedness   Dizziness   Hematuria, unspecified type   Hyperglycemia due to diabetes mellitus (Multi)   Anxiety       Medical Decision Making  Labs were all normal, including CBC, CMP, and UA (showing trace blood).    PHQ9 score:  3/27 (minimal depression)    Scripts for meclizine for dizziness and hydroxyzine for episodic anxiety have been forwarded to the patient's pharmacy    Strongly recommended to the patient that he focus his attention on adequate sleep, eating right, and routine exercise.    He is instructed to follow-up with his PCP in 7 to 10 days.        Procedure  Procedures     Malik Mullen MD  04/12/24 1442       Malik Mullen MD  04/17/24 1141

## 2024-04-19 ENCOUNTER — APPOINTMENT (OUTPATIENT)
Dept: RADIOLOGY | Facility: HOSPITAL | Age: 29
End: 2024-04-19
Payer: COMMERCIAL

## 2024-04-19 ENCOUNTER — HOSPITAL ENCOUNTER (EMERGENCY)
Facility: HOSPITAL | Age: 29
Discharge: HOME | End: 2024-04-19
Attending: EMERGENCY MEDICINE
Payer: COMMERCIAL

## 2024-04-19 ENCOUNTER — APPOINTMENT (OUTPATIENT)
Dept: CARDIOLOGY | Facility: HOSPITAL | Age: 29
End: 2024-04-19
Payer: COMMERCIAL

## 2024-04-19 VITALS
WEIGHT: 297.62 LBS | BODY MASS INDEX: 37.01 KG/M2 | HEART RATE: 76 BPM | DIASTOLIC BLOOD PRESSURE: 86 MMHG | RESPIRATION RATE: 16 BRPM | TEMPERATURE: 97.9 F | HEIGHT: 75 IN | OXYGEN SATURATION: 98 % | SYSTOLIC BLOOD PRESSURE: 135 MMHG

## 2024-04-19 DIAGNOSIS — M94.0 COSTOCHONDRITIS: Primary | ICD-10-CM

## 2024-04-19 LAB
ALBUMIN SERPL BCP-MCNC: 4.8 G/DL (ref 3.4–5)
ALP SERPL-CCNC: 73 U/L (ref 33–120)
ALT SERPL W P-5'-P-CCNC: 19 U/L (ref 10–52)
ANION GAP SERPL CALC-SCNC: 12 MMOL/L (ref 10–20)
AST SERPL W P-5'-P-CCNC: 18 U/L (ref 9–39)
BASOPHILS # BLD AUTO: 0.03 X10*3/UL (ref 0–0.1)
BASOPHILS NFR BLD AUTO: 0.2 %
BILIRUB SERPL-MCNC: 0.6 MG/DL (ref 0–1.2)
BUN SERPL-MCNC: 15 MG/DL (ref 6–23)
CALCIUM SERPL-MCNC: 9.8 MG/DL (ref 8.6–10.3)
CARDIAC TROPONIN I PNL SERPL HS: <3 NG/L (ref 0–20)
CHLORIDE SERPL-SCNC: 102 MMOL/L (ref 98–107)
CO2 SERPL-SCNC: 28 MMOL/L (ref 21–32)
CREAT SERPL-MCNC: 1.09 MG/DL (ref 0.5–1.3)
D DIMER PPP FEU-MCNC: <0.19 MG/L FEU (ref 0.19–0.5)
EGFRCR SERPLBLD CKD-EPI 2021: >90 ML/MIN/1.73M*2
EOSINOPHIL # BLD AUTO: 0.11 X10*3/UL (ref 0–0.7)
EOSINOPHIL NFR BLD AUTO: 0.9 %
ERYTHROCYTE [DISTWIDTH] IN BLOOD BY AUTOMATED COUNT: 12 % (ref 11.5–14.5)
GLUCOSE BLD MANUAL STRIP-MCNC: 72 MG/DL (ref 74–99)
GLUCOSE SERPL-MCNC: 82 MG/DL (ref 74–99)
HCT VFR BLD AUTO: 47.8 % (ref 41–52)
HGB BLD-MCNC: 16.1 G/DL (ref 13.5–17.5)
IMM GRANULOCYTES # BLD AUTO: 0.02 X10*3/UL (ref 0–0.7)
IMM GRANULOCYTES NFR BLD AUTO: 0.2 % (ref 0–0.9)
LYMPHOCYTES # BLD AUTO: 3.45 X10*3/UL (ref 1.2–4.8)
LYMPHOCYTES NFR BLD AUTO: 27.6 %
MCH RBC QN AUTO: 28.6 PG (ref 26–34)
MCHC RBC AUTO-ENTMCNC: 33.7 G/DL (ref 32–36)
MCV RBC AUTO: 85 FL (ref 80–100)
MONOCYTES # BLD AUTO: 1 X10*3/UL (ref 0.1–1)
MONOCYTES NFR BLD AUTO: 8 %
NEUTROPHILS # BLD AUTO: 7.9 X10*3/UL (ref 1.2–7.7)
NEUTROPHILS NFR BLD AUTO: 63.1 %
NRBC BLD-RTO: ABNORMAL /100{WBCS}
PLATELET # BLD AUTO: 225 X10*3/UL (ref 150–450)
POTASSIUM SERPL-SCNC: 3.8 MMOL/L (ref 3.5–5.3)
PROT SERPL-MCNC: 7.9 G/DL (ref 6.4–8.2)
RBC # BLD AUTO: 5.63 X10*6/UL (ref 4.5–5.9)
SODIUM SERPL-SCNC: 138 MMOL/L (ref 136–145)
WBC # BLD AUTO: 12.5 X10*3/UL (ref 4.4–11.3)

## 2024-04-19 PROCEDURE — 36415 COLL VENOUS BLD VENIPUNCTURE: CPT | Performed by: EMERGENCY MEDICINE

## 2024-04-19 PROCEDURE — 84075 ASSAY ALKALINE PHOSPHATASE: CPT | Performed by: EMERGENCY MEDICINE

## 2024-04-19 PROCEDURE — 93005 ELECTROCARDIOGRAM TRACING: CPT

## 2024-04-19 PROCEDURE — 2500000004 HC RX 250 GENERAL PHARMACY W/ HCPCS (ALT 636 FOR OP/ED): Performed by: EMERGENCY MEDICINE

## 2024-04-19 PROCEDURE — 99284 EMERGENCY DEPT VISIT MOD MDM: CPT

## 2024-04-19 PROCEDURE — 84484 ASSAY OF TROPONIN QUANT: CPT | Performed by: EMERGENCY MEDICINE

## 2024-04-19 PROCEDURE — 71046 X-RAY EXAM CHEST 2 VIEWS: CPT

## 2024-04-19 PROCEDURE — 85300 ANTITHROMBIN III ACTIVITY: CPT | Performed by: EMERGENCY MEDICINE

## 2024-04-19 PROCEDURE — 80053 COMPREHEN METABOLIC PANEL: CPT | Performed by: EMERGENCY MEDICINE

## 2024-04-19 PROCEDURE — 85025 COMPLETE CBC W/AUTO DIFF WBC: CPT | Performed by: EMERGENCY MEDICINE

## 2024-04-19 PROCEDURE — 82947 ASSAY GLUCOSE BLOOD QUANT: CPT | Mod: 59

## 2024-04-19 PROCEDURE — 71046 X-RAY EXAM CHEST 2 VIEWS: CPT | Performed by: RADIOLOGY

## 2024-04-19 PROCEDURE — 96374 THER/PROPH/DIAG INJ IV PUSH: CPT

## 2024-04-19 RX ORDER — KETOROLAC TROMETHAMINE 30 MG/ML
30 INJECTION, SOLUTION INTRAMUSCULAR; INTRAVENOUS ONCE
Status: COMPLETED | OUTPATIENT
Start: 2024-04-19 | End: 2024-04-19

## 2024-04-19 RX ORDER — KETOROLAC TROMETHAMINE 10 MG/1
10 TABLET, FILM COATED ORAL EVERY 6 HOURS PRN
Qty: 20 TABLET | Refills: 0 | Status: SHIPPED | OUTPATIENT
Start: 2024-04-19 | End: 2024-04-24

## 2024-04-19 RX ADMIN — KETOROLAC TROMETHAMINE 30 MG: 30 INJECTION INTRAMUSCULAR; INTRAVENOUS at 17:13

## 2024-04-19 ASSESSMENT — PAIN - FUNCTIONAL ASSESSMENT
PAIN_FUNCTIONAL_ASSESSMENT: 0-10
PAIN_FUNCTIONAL_ASSESSMENT: 0-10

## 2024-04-19 ASSESSMENT — PAIN DESCRIPTION - LOCATION
LOCATION: CHEST
LOCATION: CHEST

## 2024-04-19 ASSESSMENT — COLUMBIA-SUICIDE SEVERITY RATING SCALE - C-SSRS
1. IN THE PAST MONTH, HAVE YOU WISHED YOU WERE DEAD OR WISHED YOU COULD GO TO SLEEP AND NOT WAKE UP?: NO
6. HAVE YOU EVER DONE ANYTHING, STARTED TO DO ANYTHING, OR PREPARED TO DO ANYTHING TO END YOUR LIFE?: NO
2. HAVE YOU ACTUALLY HAD ANY THOUGHTS OF KILLING YOURSELF?: NO

## 2024-04-19 ASSESSMENT — PAIN DESCRIPTION - DESCRIPTORS: DESCRIPTORS: TIGHTNESS

## 2024-04-19 ASSESSMENT — PAIN SCALES - GENERAL
PAINLEVEL_OUTOF10: 4
PAINLEVEL_OUTOF10: 6
PAINLEVEL_OUTOF10: 3
PAINLEVEL_OUTOF10: 6

## 2024-04-19 ASSESSMENT — PAIN DESCRIPTION - PAIN TYPE: TYPE: ACUTE PAIN

## 2024-04-19 NOTE — ED PROVIDER NOTES
HPI   Chief Complaint   Patient presents with    Chest Pain     Male patient presents to the ED with complaints of left sided chest pain a few days ago while he was at rest. He states he felt dizzy and lightheaded. He denies having any recent illness, no fever, cough, chills or any other complaints.        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    Ocular hypertension of left eye    Ocular laceration without prolapse or loss of intraocular tissue, left eye, initial encounter     Ruptured globe, left eye    Personal history of other diseases of the nervous system and sense organs     History of retinal detachment    Personal history of other diseases of the respiratory system     Personal history of asthma    Unspecified astigmatism, unspecified eye 01/23/2018    Myopic astigmatism    Unspecified corneal scar and opacity 01/23/2018    Cornea scar     Past Surgical History:   Procedure Laterality Date    OTHER SURGICAL HISTORY  05/13/2014    Repair Of Rupture Of Eyeball     Family History   Problem Relation Name Age of Onset    Anxiety disorder Mother      Depression Mother      Hypertension Mother      Other (embolism) Father      Diabetes Father      Breast cancer Paternal Grandmother      Lung cancer Paternal Grandfather       Social History     Tobacco Use    Smoking status: Never    Smokeless tobacco: Never   Vaping Use    Vaping status: Never Used   Substance Use Topics    Alcohol use: Never    Drug use: Never       Physical Exam   ED Triage Vitals [04/19/24 1525]   Temperature Heart Rate Respirations BP   36.6 °C (97.9 °F) 76 16 148/83      Pulse Ox Temp Source Heart Rate Source Patient  Position   99 % Temporal Monitor Sitting      BP Location FiO2 (%)     Left arm --       Physical Exam  Patient is alert in no acute distress  Lungs are clear  Cardiac is regular rate and rhythm  Abdomen is benign  Extremities without cyanosis clubbing erythema or edema  Musculoskeletal  Positive pain to palpation anterior left chest wall  ED Course & MDM   Diagnoses as of 04/19/24 1709   Costochondritis       Medical Decision Making  Patient's symptoms are consistent with costochondritis  Labs troponin D-dimer chest x-ray are all normal  Will treat with ketorolac  Follow-up PCP    Procedure  Procedures     Zeinab Ann MD  04/19/24 1711     99

## 2024-04-22 ENCOUNTER — TELEPHONE (OUTPATIENT)
Dept: PHARMACY | Facility: HOSPITAL | Age: 29
End: 2024-04-22
Payer: COMMERCIAL

## 2024-04-22 LAB
ATRIAL RATE: 82 BPM
P AXIS: 41 DEGREES
P OFFSET: 212 MS
P ONSET: 157 MS
PR INTERVAL: 138 MS
Q ONSET: 226 MS
QRS COUNT: 13 BEATS
QRS DURATION: 82 MS
QT INTERVAL: 330 MS
QTC CALCULATION(BAZETT): 385 MS
QTC FREDERICIA: 366 MS
R AXIS: 35 DEGREES
T AXIS: 44 DEGREES
T OFFSET: 391 MS
VENTRICULAR RATE: 82 BPM

## 2024-04-22 NOTE — TELEPHONE ENCOUNTER
Patient has been going to urgent care for evaluation of dizziness and headaches. States this is daily. Was given meclizine at last visit.     No longer taking Paxil, states he doesn't like how it made him feel and is interested in trying something else.     New insurance not contracted with PCP, trying to change insurances.     Med list updated.     Casi BandaD [facility]  Clinical Pharmacy Specialist, Primary Care

## 2024-05-01 DIAGNOSIS — E11.9 DIABETES MELLITUS TYPE 2 WITHOUT RETINOPATHY (MULTI): Primary | ICD-10-CM

## 2024-05-01 RX ORDER — DULAGLUTIDE 1.5 MG/.5ML
1.5 INJECTION, SOLUTION SUBCUTANEOUS
Qty: 2 ML | Refills: 2 | Status: SHIPPED | OUTPATIENT
Start: 2024-05-05 | End: 2024-05-14 | Stop reason: WASHOUT

## 2024-05-01 NOTE — TELEPHONE ENCOUNTER
Patient has new insurance and normal [facility] not have Trulicity 1.5 mg in stock. [facility] has stock, will send new Rx to them for patient to .     Casi Hansen PharmD [facility]  Clinical Pharmacy Specialist, Primary Care

## 2024-05-02 ENCOUNTER — TELEPHONE (OUTPATIENT)
Dept: PRIMARY CARE | Facility: CLINIC | Age: 29
End: 2024-05-02

## 2024-05-02 PROCEDURE — RXMED WILLOW AMBULATORY MEDICATION CHARGE

## 2024-05-03 ENCOUNTER — PHARMACY VISIT (OUTPATIENT)
Dept: PHARMACY | Facility: CLINIC | Age: 29
End: 2024-05-03
Payer: MEDICARE

## 2024-05-04 ENCOUNTER — HOSPITAL ENCOUNTER (EMERGENCY)
Facility: HOSPITAL | Age: 29
Discharge: HOME | End: 2024-05-04
Attending: HOSPITALIST
Payer: COMMERCIAL

## 2024-05-04 ENCOUNTER — APPOINTMENT (OUTPATIENT)
Dept: CARDIOLOGY | Facility: HOSPITAL | Age: 29
End: 2024-05-04
Payer: COMMERCIAL

## 2024-05-04 VITALS
HEART RATE: 81 BPM | HEIGHT: 75 IN | RESPIRATION RATE: 18 BRPM | SYSTOLIC BLOOD PRESSURE: 145 MMHG | OXYGEN SATURATION: 97 % | WEIGHT: 293.7 LBS | TEMPERATURE: 98.8 F | DIASTOLIC BLOOD PRESSURE: 84 MMHG | BODY MASS INDEX: 36.52 KG/M2

## 2024-05-04 DIAGNOSIS — F41.9 ANXIETY: Primary | ICD-10-CM

## 2024-05-04 PROCEDURE — 2500000001 HC RX 250 WO HCPCS SELF ADMINISTERED DRUGS (ALT 637 FOR MEDICARE OP): Performed by: HOSPITALIST

## 2024-05-04 PROCEDURE — 93005 ELECTROCARDIOGRAM TRACING: CPT

## 2024-05-04 PROCEDURE — 99283 EMERGENCY DEPT VISIT LOW MDM: CPT

## 2024-05-04 RX ORDER — LORAZEPAM 1 MG/1
1 TABLET ORAL ONCE
Status: COMPLETED | OUTPATIENT
Start: 2024-05-04 | End: 2024-05-04

## 2024-05-04 RX ORDER — LORAZEPAM 1 MG/1
1 TABLET ORAL EVERY 8 HOURS PRN
Qty: 20 TABLET | Refills: 0 | Status: SHIPPED | OUTPATIENT
Start: 2024-05-04 | End: 2024-05-14 | Stop reason: ALTCHOICE

## 2024-05-04 RX ADMIN — LORAZEPAM 1 MG: 1 TABLET ORAL at 23:46

## 2024-05-04 SDOH — HEALTH STABILITY: MENTAL HEALTH

## 2024-05-04 SDOH — HEALTH STABILITY: MENTAL HEALTH: BEHAVIORS/MOOD: ANXIOUS

## 2024-05-04 ASSESSMENT — PAIN DESCRIPTION - DESCRIPTORS
DESCRIPTORS: ACHING
DESCRIPTORS: ACHING

## 2024-05-04 ASSESSMENT — PAIN - FUNCTIONAL ASSESSMENT: PAIN_FUNCTIONAL_ASSESSMENT: 0-10

## 2024-05-04 ASSESSMENT — PAIN DESCRIPTION - LOCATION: LOCATION: CHEST

## 2024-05-04 ASSESSMENT — PAIN DESCRIPTION - PAIN TYPE: TYPE: CHRONIC PAIN

## 2024-05-04 ASSESSMENT — PAIN DESCRIPTION - ORIENTATION: ORIENTATION: LEFT

## 2024-05-04 ASSESSMENT — PAIN SCALES - GENERAL: PAINLEVEL_OUTOF10: 7

## 2024-05-05 NOTE — DISCHARGE INSTRUCTIONS
Take Ativan as needed for severe anxiety.  Follow-up with your primary care physician as previously scheduled  Contact your previous psychiatrist to make an appointment.  A referral was ordered for  psychiatry if you are unable to make an appointment with your previous psychiatrist

## 2024-05-05 NOTE — ED PROVIDER NOTES
HPI   Chief Complaint   Patient presents with    Chest Pain     Chest pain for a few weeks, pain is the same that is has been for the last couple of weeks. Pain is on the L side of chest. Does not radiate, achy in nature. Denies SOB, N/V. Pt took something for pain, starts with an M, was prescribed to him during last visit.     Anxiety     Pt states this consistent chest pain has been making him more anxious than normal. Pt states that he doesn't take anything anymore for his anxiety   This is the patient's third visit to the emergency room last few weeks.  His first visit he was prescribed hydroxyzine to take for anxiety.  He states that he never received this medication when he went to the pharmacy.  On his last visit he had noncardiac chest pain was given a prescription for ketorolac.  He has not seen a psychiatrist in the long period of time.  In the past he was on Paxil which she is no longer taking.  He has a scheduled appointment with his primary care physician in 2 weeks.        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    Ocular hypertension of left eye    Ocular laceration without prolapse or loss of intraocular tissue, left eye, initial encounter     Ruptured globe, left eye    Personal history of other diseases of the nervous system and sense organs     History of retinal detachment    Personal history of other diseases of the respiratory system     Personal history of asthma    Unspecified astigmatism, unspecified eye 01/23/2018    Myopic astigmatism    Unspecified corneal scar and opacity 01/23/2018    Cornea scar     Past Surgical History:   Procedure Laterality Date    OTHER SURGICAL HISTORY  05/13/2014    Repair Of Rupture Of Eyeball     Family History   Problem Relation Name Age of Onset    Anxiety disorder Mother      Depression Mother      Hypertension Mother      Other (embolism) Father      Diabetes Father      Breast cancer Paternal  Grandmother      Lung cancer Paternal Grandfather       Social History     Tobacco Use    Smoking status: Never    Smokeless tobacco: Never   Vaping Use    Vaping status: Never Used   Substance Use Topics    Alcohol use: Never    Drug use: Never       Physical Exam   ED Triage Vitals [05/04/24 2305]   Temperature Heart Rate Respirations BP   37.1 °C (98.8 °F) 96 18 150/87      Pulse Ox Temp Source Heart Rate Source Patient Position   97 % Oral Monitor --      BP Location FiO2 (%)     Left arm --       Physical Exam  Gen.: Alert and oriented ×3, no acute distress  Head: Normocephalic atraumatic  Eyes:  Pupils equal reactive to light, extraocular muscle intact  Neck: No cervical lymphadenopathy thyromegaly, trachea midline   Heart: Regular rate and rhythm, no murmurs rubs or gallops  Lungs: Clear to auscultation bilaterally, no wheezes, rales, or rhonchi  Abdomen: Soft nontender positive bowel sounds, no rebound or guarding  Extremities: No peripheral edema cyanosis or clubbing  Skin: Warm and intact  Neuro: Cranial nerves II 2 through 12 grossly intact, no focal deficits  Psych: Insight and judgment intact      ED Course & MDM   Diagnoses as of 05/05/24 0424   Anxiety       Medical Decision Making  Patient is EKG was normal sinus rhythm unchanged from to previous EKGs.  Patient was reassured that his symptoms are not cardiac and most likely are due to untreated anxiety and depression.  He was given Ativan 1 mg orally in the emergency room.  He will be given prescription on discharge for 1 week and was advised to follow-up with a psychiatrist until then.  A referral was also placed for  psychiatry, in case he is unable to see his previous psychiatrist    Procedure  Procedures     Jarrod Dias,   05/04/24 2337       Jarrod Dias,   05/05/24 0424

## 2024-05-06 LAB
ATRIAL RATE: 82 BPM
P AXIS: 28 DEGREES
P OFFSET: 208 MS
P ONSET: 152 MS
PR INTERVAL: 148 MS
Q ONSET: 226 MS
QRS COUNT: 14 BEATS
QRS DURATION: 82 MS
QT INTERVAL: 328 MS
QTC CALCULATION(BAZETT): 383 MS
QTC FREDERICIA: 364 MS
R AXIS: 15 DEGREES
T AXIS: 18 DEGREES
T OFFSET: 390 MS
VENTRICULAR RATE: 82 BPM

## 2024-05-14 ENCOUNTER — OFFICE VISIT (OUTPATIENT)
Dept: PRIMARY CARE | Facility: CLINIC | Age: 29
End: 2024-05-14
Payer: COMMERCIAL

## 2024-05-14 VITALS
DIASTOLIC BLOOD PRESSURE: 84 MMHG | SYSTOLIC BLOOD PRESSURE: 131 MMHG | HEART RATE: 81 BPM | OXYGEN SATURATION: 97 % | BODY MASS INDEX: 36.75 KG/M2 | WEIGHT: 294 LBS

## 2024-05-14 DIAGNOSIS — F41.9 ANXIETY: Primary | ICD-10-CM

## 2024-05-14 DIAGNOSIS — R00.2 HEART PALPITATIONS: ICD-10-CM

## 2024-05-14 DIAGNOSIS — R07.9 CHEST PAIN, UNSPECIFIED TYPE: ICD-10-CM

## 2024-05-14 DIAGNOSIS — D72.829 LEUKOCYTOSIS, UNSPECIFIED TYPE: ICD-10-CM

## 2024-05-14 DIAGNOSIS — E11.9 DIABETES MELLITUS TYPE 2 WITHOUT RETINOPATHY (MULTI): ICD-10-CM

## 2024-05-14 PROBLEM — H57.12 PAIN OF LEFT EYE: Status: ACTIVE | Noted: 2019-08-06

## 2024-05-14 PROBLEM — M94.0 COSTOCHONDRITIS: Status: ACTIVE | Noted: 2024-05-14

## 2024-05-14 PROBLEM — R42 LIGHTHEADEDNESS: Status: ACTIVE | Noted: 2024-05-14

## 2024-05-14 PROBLEM — L73.1 PSEUDOFOLLICULITIS BARBAE: Status: ACTIVE | Noted: 2019-08-07

## 2024-05-14 PROBLEM — H40.059 OCULAR HYPERTENSION: Status: ACTIVE | Noted: 2019-05-20

## 2024-05-14 PROBLEM — L73.0 ACNE KELOID: Status: ACTIVE | Noted: 2019-08-07

## 2024-05-14 PROBLEM — L73.0 FOLLICULITIS KELOIDALIS NUCHAE: Status: ACTIVE | Noted: 2023-03-12

## 2024-05-14 PROBLEM — S05.30XA RUPTURE OF GLOBE: Status: ACTIVE | Noted: 2024-05-14

## 2024-05-14 PROBLEM — F41.8 MIXED ANXIETY DEPRESSIVE DISORDER: Status: ACTIVE | Noted: 2023-03-12

## 2024-05-14 PROBLEM — R31.9 HEMATURIA: Status: ACTIVE | Noted: 2024-05-14

## 2024-05-14 PROCEDURE — G2211 COMPLEX E/M VISIT ADD ON: HCPCS | Performed by: STUDENT IN AN ORGANIZED HEALTH CARE EDUCATION/TRAINING PROGRAM

## 2024-05-14 PROCEDURE — 3079F DIAST BP 80-89 MM HG: CPT | Performed by: STUDENT IN AN ORGANIZED HEALTH CARE EDUCATION/TRAINING PROGRAM

## 2024-05-14 PROCEDURE — 99214 OFFICE O/P EST MOD 30 MIN: CPT | Performed by: STUDENT IN AN ORGANIZED HEALTH CARE EDUCATION/TRAINING PROGRAM

## 2024-05-14 PROCEDURE — 3075F SYST BP GE 130 - 139MM HG: CPT | Performed by: STUDENT IN AN ORGANIZED HEALTH CARE EDUCATION/TRAINING PROGRAM

## 2024-05-14 RX ORDER — DULAGLUTIDE 1.5 MG/.5ML
1.5 INJECTION, SOLUTION SUBCUTANEOUS
Qty: 2 ML | Refills: 2 | Status: SHIPPED | OUTPATIENT
Start: 2024-05-14

## 2024-05-14 RX ORDER — DULAGLUTIDE 0.75 MG/.5ML
INJECTION, SOLUTION SUBCUTANEOUS
COMMUNITY
Start: 2024-05-07 | End: 2024-05-14 | Stop reason: ALTCHOICE

## 2024-05-14 RX ORDER — BLOOD-GLUCOSE SENSOR
EACH MISCELLANEOUS
Qty: 3 EACH | Refills: 1 | Status: SHIPPED | OUTPATIENT
Start: 2024-05-14

## 2024-05-14 RX ORDER — BLOOD-GLUCOSE,RECEIVER,CONT
EACH MISCELLANEOUS
Qty: 1 EACH | Refills: 0 | Status: SHIPPED | OUTPATIENT
Start: 2024-05-14

## 2024-05-14 RX ORDER — DULAGLUTIDE 1.5 MG/.5ML
1.5 INJECTION, SOLUTION SUBCUTANEOUS
Qty: 2 ML | Refills: 2 | Status: SHIPPED | OUTPATIENT
Start: 2024-05-14 | End: 2024-05-14

## 2024-05-14 RX ORDER — BUSPIRONE HYDROCHLORIDE 7.5 MG/1
7.5 TABLET ORAL 2 TIMES DAILY
Qty: 60 TABLET | Refills: 1 | Status: SHIPPED | OUTPATIENT
Start: 2024-05-14 | End: 2024-06-05

## 2024-05-14 NOTE — PATIENT INSTRUCTIONS
Labs today in [address]    Call [phone] to coordinate ECHOCARDIOGRAM (picture of heart) and HOLTER MONITOR (heart rhythm monitoring).    Buspirone 7.5mg twice daily for anxiety  Can increase to three times daily if needed    Referral to Behavioral Health, Ethel Da Sliva    Discussed with Casi; have prescribed the G7 Dexcom; if issues--reach out to her    Follow up with me in 1-2 months to check in!    Dr. CHAU

## 2024-05-14 NOTE — PROGRESS NOTES
Ersato Botello is a 28 y.o. male seen in Clinic at /[facility] by Dr. Cesar Díaz on 05/14/24 for routine care, as well as for management of the following chronic medical conditions: T2DM, anxiety, PTSD. He presents today for follow up visit.     Per chart documentation, had lapse in insurance coverage, hence delayed follow up. Has been seen recently in urgent care and ED for multiple complaints/symptoms including dizziness, headaches, and chest pain. Overall reassuring labs/imaging (mild leukocytosis with neutrophilic predominance noted). Had been tried on antihistamine (Meclizine) in the past for symptom relief. Also had been resumed on his SSRI given correlation with him suddenly starting and stopping medication at max dosage without titration. He is now OFF Paxil, though interested in trying something else.     #Chest Pain   - remote negative stress testing  - reassuring acute cardiac evaluation in ED  - NOT correlated to activity; typically during period of rest  [  ] keep journal of symptoms   [  ] Holter, echo to further characterize  [  ] updated labs     #T2DM  #Obesity   - follows with pharmacy   - last A1C 5.9% in 11/2023  - current regimen: Trulicity 1.5mg weekly   - reassuring baseline renal function  - urine albumin reassuring in 04/2023  - prior dietician referral today  - did discuss unlikelihood of true hypoglycemia, CGM offered but would like to hold off for now due to out of pocket costs  [  ] last optho evaluation/visit - August, 2021. Due for exam and encouraged  [  ] CMP, A1C, urine albumin today   [  ] start G7 CGM    #Anxiety   #PTSD  #Concern for OCD  - previously followed with psych   - current medication regimen: NONE; prior Paroxetine  [  ] discuss alternative options: agreeable to Buspirone trial, start today   [  ] behavioral health referral     #Lipid Management   - cholesterol overall good, LDL in 90s per labs 11/2023    - defer statin for now given DM otherwise well  controlled  - lifestyle modification, diet with lot of room for improvement; prior dietician referral     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    Ocular hypertension of left eye    Ocular laceration without prolapse or loss of intraocular tissue, left eye, initial encounter     Ruptured globe, left eye    Personal history of other diseases of the nervous system and sense organs     History of retinal detachment    Personal history of other diseases of the respiratory system     Personal history of asthma    Unspecified astigmatism, unspecified eye 01/23/2018    Myopic astigmatism    Unspecified corneal scar and opacity 01/23/2018    Cornea scar     Subspecialty Medical Care: Psychiatry    Past Surgical History:   Past Surgical History:   Procedure Laterality Date    OTHER SURGICAL HISTORY  05/13/2014    Repair Of Rupture Of Eyeball     Surgery/Location/Date:     Medications:   Current Outpatient Medications:     benzoyl peroxide 5 % external wash, 1 Application, Disp: , Rfl:     busPIRone (Buspar) 7.5 mg tablet, Take 1 tablet (7.5 mg) by mouth 2 times a day., Disp: 60 tablet, Rfl: 1    cholecalciferol (Vitamin D3) 50 MCG (2000 UT) tablet, Take 1 tablet (50 mcg) by mouth once daily., Disp: 90 tablet, Rfl: 3    clindamycin (Cleocin T) 1 % external solution, 1 Application, Disp: , Rfl:     Dexcom G7  misc, Use as instructed, Disp: 1 each, Rfl: 0    Dexcom G7 Sensor device, Use with DEXCOM device for continuous blood glucose management. Change every 10 days., Disp: 3 each, Rfl: 1    dulaglutide (Trulicity) 1.5 mg/0.5 mL pen injector injection, Inject 1.5 mg under the skin 1 (one) time per week., Disp: 2 mL, Rfl: 2    FreeStyle glucose monitoring (OneTouch Ultra2 Meter) kit, USE TO TEST BLOOD SUGAR TWICE DAILY, Disp: 1 each, Rfl: 0    lancets 30 gauge misc, , Disp: , Rfl:     metFORMIN (Glucophage) 500 mg tablet, Take 1 tablet (500 mg) by mouth  2 times a day with meals., Disp: , Rfl:     OneTouch Ultra Test strip, Test sugars twice daily, Disp: 100 strip, Rfl: 11    OneTouch Ultra2 Meter misc, USE TO TEST BLOOD SUGAR TWICE DAILY, Disp: , Rfl:   -Does not take citalopram or vitamin D    Pharmacy: [facility]    Allergies:   No Known Allergies    Immunizations:   - Flu UTD 2023  - Tdap 2023  - Prevnar-20: 2023     Family History:   Family History   Problem Relation Name Age of Onset    Anxiety disorder Mother      Depression Mother      Hypertension Mother      Other (embolism) Father      Diabetes Father      Breast cancer Paternal Grandmother      Lung cancer Paternal Grandfather       Social History:   Home/Living Situation: Lives at home with daughter  Education: Diploma, some college  Employment/Work/Vocational: Works at a school  Activities: see family, play games  Drug Use: None  Alcohol: Occasional  Tobacco: Denies  Diet: Switches between health/unhealthy meals  Exercise: Walking at work  Sexuality: Currently sexually active with women.  Suicide/Depression/Anxiety: Has anxiety. No depression or suicide  Sleep: Sleeps well    Visit Vitals  /84   Pulse 81   Wt 133 kg (294 lb)   SpO2 97%   BMI 36.75 kg/m²   Smoking Status Never   BSA 2.65 m²      PHYSICAL EXAM:   General: well appearing AA male, NAD, pleasant and engaged in encounter    HEENT: NCAT, MMM, opaque left eye  CV: RRR, no m/r/g  PULM: CTAB, non-labored respirations   ABD: soft, obese, NT, ND  : no suprapubic or CVA tenderness   EXT: WWP, no significant edema   SKIN: no rashes noted   NEURO: A&Ox4, symmetric facies, no gross motor or sensory deficits, normal gait  PSYCH: pleasant mood, appropriate affect     Assessment/Plan    Erasto Botello is a 28 y.o. male seen in Clinic at /[facility] by Dr. Cesar Díaz on 5/14/2024 for routine care, as well as for management of the following chronic medical conditions:  T2DM, anxiety, PTSD. He presents today for follow up  visit.     Per chart documentation, had lapse in insurance coverage, hence delayed follow up. Has been seen recently in urgent care and ED for multiple complaints/symptoms including dizziness, headaches, and chest pain. Overall reassuring labs/imaging (mild leukocytosis with neutrophilic predominance noted). Had been tried on antihistamine (Meclizine) in the past for symptom relief. Also had been resumed on his SSRI given correlation with him suddenly starting and stopping medication at max dosage without titration. He is now OFF Paxil, though interested in trying something else.     #Chest Pain   - remote negative stress testing  - reassuring acute cardiac evaluation in ED  - NOT correlated to activity; typically during period of rest  [  ] keep journal of symptoms   [  ] Holter, echo to further characterize  [  ] updated labs     #T2DM  #Obesity   - follows with pharmacy   - last A1C 5.9% in 11/2023  - current regimen: Trulicity 1.5mg weekly   - reassuring baseline renal function  - urine albumin reassuring in 04/2023  - prior dietician referral today  - did discuss unlikelihood of true hypoglycemia, CGM offered but would like to hold off for now due to out of pocket costs  [  ] last optho evaluation/visit - August, 2021. Due for exam and encouraged  [  ] CMP, A1C, urine albumin today   [  ] start G7 CGM    #Anxiety   #PTSD  #Concern for OCD  - previously followed with psych   - current medication regimen: NONE; prior Paroxetine  [  ] discuss alternative options: agreeable to Buspirone trial, start today   [  ] behavioral health referral     #Lipid Management   - cholesterol overall good, LDL in 90s per labs 11/2023    - defer statin for now given DM otherwise well controlled  - lifestyle modification, diet with lot of room for improvement; prior dietician referral     #Health Maintenance   - Immunizations: Tdap 2023-->due 2033, PCV-20 in 2023 (T2DM status)   - Vision, Hearing screens: recommend following with  optho given history   - Counseling regarding alcohol/tobacco/drug use: not needed  - Depression screen: anxiety as above, behavioral health referral  - BMI, Lipid, A1C screening and nutritional/exercise counseling: labs today   - Blood Pressure: WNL  - Safe Sexual Practices, STI, HIV screening: deferred at last CPE     Referrals: Behavioral health, Buspirone trial, Echo, Holter, CGM new start, pharmacy follow up, labs     Return to clinic for follow-up in 1-2 months for close follow up.     Cesar Díaz MD  Internal Medicine-Pediatrics   [facility] [address] [place] [address], [address]  P: [phone], F: [phone]

## 2024-05-16 ENCOUNTER — LAB (OUTPATIENT)
Dept: LAB | Facility: LAB | Age: 29
End: 2024-05-16
Payer: COMMERCIAL

## 2024-05-16 ENCOUNTER — HOSPITAL ENCOUNTER (OUTPATIENT)
Dept: CARDIOLOGY | Facility: HOSPITAL | Age: 29
Discharge: HOME | End: 2024-05-16
Payer: COMMERCIAL

## 2024-05-16 DIAGNOSIS — R00.2 HEART PALPITATIONS: ICD-10-CM

## 2024-05-16 DIAGNOSIS — R07.9 CHEST PAIN, UNSPECIFIED TYPE: ICD-10-CM

## 2024-05-16 DIAGNOSIS — E11.9 DIABETES MELLITUS TYPE 2 WITHOUT RETINOPATHY (MULTI): ICD-10-CM

## 2024-05-16 DIAGNOSIS — D72.829 LEUKOCYTOSIS, UNSPECIFIED TYPE: ICD-10-CM

## 2024-05-16 LAB
ALBUMIN SERPL BCP-MCNC: 4.6 G/DL (ref 3.4–5)
ALP SERPL-CCNC: 85 U/L (ref 33–120)
ALT SERPL W P-5'-P-CCNC: 18 U/L (ref 10–52)
ANION GAP SERPL CALC-SCNC: 12 MMOL/L (ref 10–20)
AORTIC VALVE MEAN GRADIENT: 4.1 MMHG
AORTIC VALVE PEAK VELOCITY: 1.46 M/S
AST SERPL W P-5'-P-CCNC: 20 U/L (ref 9–39)
AV PEAK GRADIENT: 8.5 MMHG
AVA (PEAK VEL): 1.9 CM2
AVA (VTI): 2.23 CM2
BASOPHILS # BLD AUTO: 0.05 X10*3/UL (ref 0–0.1)
BASOPHILS NFR BLD AUTO: 0.4 %
BILIRUB SERPL-MCNC: 0.4 MG/DL (ref 0–1.2)
BUN SERPL-MCNC: 13 MG/DL (ref 6–23)
CALCIUM SERPL-MCNC: 9.7 MG/DL (ref 8.6–10.3)
CHLORIDE SERPL-SCNC: 105 MMOL/L (ref 98–107)
CO2 SERPL-SCNC: 26 MMOL/L (ref 21–32)
CREAT SERPL-MCNC: 0.99 MG/DL (ref 0.5–1.3)
CREAT UR-MCNC: 161.9 MG/DL (ref 20–370)
EGFRCR SERPLBLD CKD-EPI 2021: >90 ML/MIN/1.73M*2
EJECTION FRACTION APICAL 4 CHAMBER: 65.7
EOSINOPHIL # BLD AUTO: 0.15 X10*3/UL (ref 0–0.7)
EOSINOPHIL NFR BLD AUTO: 1.3 %
ERYTHROCYTE [DISTWIDTH] IN BLOOD BY AUTOMATED COUNT: 12.3 % (ref 11.5–14.5)
EST. AVERAGE GLUCOSE BLD GHB EST-MCNC: 117 MG/DL
GLUCOSE SERPL-MCNC: 91 MG/DL (ref 74–99)
HBA1C MFR BLD: 5.7 %
HCT VFR BLD AUTO: 47.6 % (ref 41–52)
HGB BLD-MCNC: 16.2 G/DL (ref 13.5–17.5)
IMM GRANULOCYTES # BLD AUTO: 0.04 X10*3/UL (ref 0–0.7)
IMM GRANULOCYTES NFR BLD AUTO: 0.3 % (ref 0–0.9)
LEFT ATRIUM VOLUME AREA LENGTH INDEX BSA: 12.2 ML/M2
LEFT VENTRICLE INTERNAL DIMENSION DIASTOLE: 4.86 CM (ref 3.5–6)
LEFT VENTRICULAR OUTFLOW TRACT DIAMETER: 2.03 CM
LV EJECTION FRACTION BIPLANE: 67 %
LYMPHOCYTES # BLD AUTO: 3.57 X10*3/UL (ref 1.2–4.8)
LYMPHOCYTES NFR BLD AUTO: 29.8 %
MCH RBC QN AUTO: 29.5 PG (ref 26–34)
MCHC RBC AUTO-ENTMCNC: 34 G/DL (ref 32–36)
MCV RBC AUTO: 87 FL (ref 80–100)
MICROALBUMIN UR-MCNC: 8.9 MG/L
MICROALBUMIN/CREAT UR: 5.5 UG/MG CREAT
MITRAL VALVE E/A RATIO: 1.13
MITRAL VALVE E/E' RATIO: 5.02
MONOCYTES # BLD AUTO: 1.1 X10*3/UL (ref 0.1–1)
MONOCYTES NFR BLD AUTO: 9.2 %
NEUTROPHILS # BLD AUTO: 7.07 X10*3/UL (ref 1.2–7.7)
NEUTROPHILS NFR BLD AUTO: 59 %
NRBC BLD-RTO: 0 /100 WBCS (ref 0–0)
PLATELET # BLD AUTO: 217 X10*3/UL (ref 150–450)
POTASSIUM SERPL-SCNC: 4.2 MMOL/L (ref 3.5–5.3)
PROT SERPL-MCNC: 7.3 G/DL (ref 6.4–8.2)
RBC # BLD AUTO: 5.5 X10*6/UL (ref 4.5–5.9)
RIGHT VENTRICLE FREE WALL PEAK S': 10 CM/S
RIGHT VENTRICLE PEAK SYSTOLIC PRESSURE: 33.2 MMHG
SODIUM SERPL-SCNC: 139 MMOL/L (ref 136–145)
TRICUSPID ANNULAR PLANE SYSTOLIC EXCURSION: 1.9 CM
TSH SERPL-ACNC: 0.82 MIU/L (ref 0.44–3.98)
WBC # BLD AUTO: 12 X10*3/UL (ref 4.4–11.3)

## 2024-05-16 PROCEDURE — 93306 TTE W/DOPPLER COMPLETE: CPT | Performed by: INTERNAL MEDICINE

## 2024-05-16 PROCEDURE — 85025 COMPLETE CBC W/AUTO DIFF WBC: CPT

## 2024-05-16 PROCEDURE — 83036 HEMOGLOBIN GLYCOSYLATED A1C: CPT

## 2024-05-16 PROCEDURE — 36415 COLL VENOUS BLD VENIPUNCTURE: CPT

## 2024-05-16 PROCEDURE — 82043 UR ALBUMIN QUANTITATIVE: CPT

## 2024-05-16 PROCEDURE — 82570 ASSAY OF URINE CREATININE: CPT

## 2024-05-16 PROCEDURE — 84443 ASSAY THYROID STIM HORMONE: CPT

## 2024-05-16 PROCEDURE — 93306 TTE W/DOPPLER COMPLETE: CPT

## 2024-05-16 PROCEDURE — 80053 COMPREHEN METABOLIC PANEL: CPT

## 2024-05-20 DIAGNOSIS — R40.0 DAYTIME SOMNOLENCE: Primary | ICD-10-CM

## 2024-05-20 NOTE — PROGRESS NOTES
Home sleep study ordered based on elevated STOP BANG and findings of elevated RSVP on recent echo.     Cesar Díaz MD

## 2024-05-22 ENCOUNTER — CLINICAL SUPPORT (OUTPATIENT)
Dept: SLEEP MEDICINE | Facility: CLINIC | Age: 29
End: 2024-05-22
Payer: COMMERCIAL

## 2024-05-22 ENCOUNTER — APPOINTMENT (OUTPATIENT)
Dept: SLEEP MEDICINE | Facility: CLINIC | Age: 29
End: 2024-05-22
Payer: COMMERCIAL

## 2024-05-22 DIAGNOSIS — G47.33 OBSTRUCTIVE SLEEP APNEA (ADULT) (PEDIATRIC): ICD-10-CM

## 2024-05-22 DIAGNOSIS — R40.0 DAYTIME SOMNOLENCE: ICD-10-CM

## 2024-05-22 PROCEDURE — 95806 SLEEP STUDY UNATT&RESP EFFT: CPT | Performed by: STUDENT IN AN ORGANIZED HEALTH CARE EDUCATION/TRAINING PROGRAM

## 2024-05-22 NOTE — PROGRESS NOTES
Type of Study: HOME SLEEP STUDY - NOMAD     The patient received equipment and instructions for use of the [employer] [facility] Nomad HSAT device. The patient was instructed how to apply the effort belts, cannula, thermistor. It was also explained how the Nomad and oximeter components work.  The patient was asked to record their sleep for an 8-hour period.     The patient was informed of their responsibility for the device and acknowledged this by signing the HSAT device contract. The patient was asked to return the device on 5/23/2024 between the hours of 8:00 am 4:00 pm to the [facility] Center.     The patient was instructed to call 911 as usual for any medical- emergencies while at home.  The patient was also given a phone number for troubleshooting when using the device in case there were additional questions.

## 2024-05-23 ENCOUNTER — HOSPITAL ENCOUNTER (OUTPATIENT)
Dept: CARDIOLOGY | Facility: HOSPITAL | Age: 29
Discharge: HOME | End: 2024-05-23
Payer: COMMERCIAL

## 2024-05-23 ENCOUNTER — OFFICE VISIT (OUTPATIENT)
Dept: CARDIOLOGY | Facility: HOSPITAL | Age: 29
End: 2024-05-23
Payer: COMMERCIAL

## 2024-05-23 VITALS
SYSTOLIC BLOOD PRESSURE: 131 MMHG | HEIGHT: 75 IN | HEART RATE: 72 BPM | BODY MASS INDEX: 36.18 KG/M2 | DIASTOLIC BLOOD PRESSURE: 87 MMHG | OXYGEN SATURATION: 98 % | WEIGHT: 291 LBS

## 2024-05-23 DIAGNOSIS — R07.9 CHEST PAIN, UNSPECIFIED TYPE: Primary | ICD-10-CM

## 2024-05-23 DIAGNOSIS — R07.9 CHEST PAIN, UNSPECIFIED TYPE: ICD-10-CM

## 2024-05-23 DIAGNOSIS — R00.2 HEART PALPITATIONS: ICD-10-CM

## 2024-05-23 LAB — BODY SURFACE AREA: 2.64 M2

## 2024-05-23 PROCEDURE — 3075F SYST BP GE 130 - 139MM HG: CPT | Performed by: INTERNAL MEDICINE

## 2024-05-23 PROCEDURE — 93005 ELECTROCARDIOGRAM TRACING: CPT | Mod: 59 | Performed by: INTERNAL MEDICINE

## 2024-05-23 PROCEDURE — 1036F TOBACCO NON-USER: CPT | Performed by: INTERNAL MEDICINE

## 2024-05-23 PROCEDURE — 3044F HG A1C LEVEL LT 7.0%: CPT | Performed by: INTERNAL MEDICINE

## 2024-05-23 PROCEDURE — 3061F NEG MICROALBUMINURIA REV: CPT | Performed by: INTERNAL MEDICINE

## 2024-05-23 PROCEDURE — 3079F DIAST BP 80-89 MM HG: CPT | Performed by: INTERNAL MEDICINE

## 2024-05-23 PROCEDURE — 93246 EXT ECG>7D<15D RECORDING: CPT

## 2024-05-23 PROCEDURE — 99214 OFFICE O/P EST MOD 30 MIN: CPT | Performed by: INTERNAL MEDICINE

## 2024-05-23 PROCEDURE — 99204 OFFICE O/P NEW MOD 45 MIN: CPT | Performed by: INTERNAL MEDICINE

## 2024-05-23 RX ORDER — TIZANIDINE HYDROCHLORIDE 2 MG/1
2 CAPSULE, GELATIN COATED ORAL 3 TIMES DAILY
Qty: 45 CAPSULE | Refills: 0 | Status: SHIPPED | OUTPATIENT
Start: 2024-05-23 | End: 2024-05-23 | Stop reason: ALTCHOICE

## 2024-05-23 RX ORDER — TIZANIDINE 2 MG/1
2 TABLET ORAL 3 TIMES DAILY
Qty: 45 TABLET | Refills: 0 | Status: SHIPPED | OUTPATIENT
Start: 2024-05-23 | End: 2024-06-06

## 2024-05-29 LAB
ATRIAL RATE: 72 BPM
P AXIS: 31 DEGREES
P OFFSET: 197 MS
P ONSET: 144 MS
PR INTERVAL: 142 MS
Q ONSET: 215 MS
QRS COUNT: 11 BEATS
QRS DURATION: 84 MS
QT INTERVAL: 350 MS
QTC CALCULATION(BAZETT): 383 MS
QTC FREDERICIA: 372 MS
R AXIS: 17 DEGREES
T AXIS: 13 DEGREES
T OFFSET: 390 MS
VENTRICULAR RATE: 72 BPM

## 2024-06-03 ENCOUNTER — OFFICE VISIT (OUTPATIENT)
Dept: SLEEP MEDICINE | Facility: CLINIC | Age: 29
End: 2024-06-03
Payer: COMMERCIAL

## 2024-06-03 DIAGNOSIS — E66.01 CLASS 2 SEVERE OBESITY DUE TO EXCESS CALORIES WITH SERIOUS COMORBIDITY AND BODY MASS INDEX (BMI) OF 36.0 TO 36.9 IN ADULT (MULTI): Primary | ICD-10-CM

## 2024-06-03 DIAGNOSIS — G47.33 OBSTRUCTIVE SLEEP APNEA (ADULT) (PEDIATRIC): ICD-10-CM

## 2024-06-03 PROCEDURE — 1036F TOBACCO NON-USER: CPT | Performed by: PSYCHIATRY & NEUROLOGY

## 2024-06-03 PROCEDURE — 3008F BODY MASS INDEX DOCD: CPT | Performed by: PSYCHIATRY & NEUROLOGY

## 2024-06-03 PROCEDURE — 99204 OFFICE O/P NEW MOD 45 MIN: CPT | Performed by: PSYCHIATRY & NEUROLOGY

## 2024-06-03 PROCEDURE — 3044F HG A1C LEVEL LT 7.0%: CPT | Performed by: PSYCHIATRY & NEUROLOGY

## 2024-06-03 PROCEDURE — 3061F NEG MICROALBUMINURIA REV: CPT | Performed by: PSYCHIATRY & NEUROLOGY

## 2024-06-03 ASSESSMENT — PATIENT HEALTH QUESTIONNAIRE - PHQ9
1. LITTLE INTEREST OR PLEASURE IN DOING THINGS: NOT AT ALL
SUM OF ALL RESPONSES TO PHQ9 QUESTIONS 1 AND 2: 0
2. FEELING DOWN, DEPRESSED OR HOPELESS: NOT AT ALL

## 2024-06-03 NOTE — PROGRESS NOTES
Patient: Erasto Botello    [id number]  : 1995 -- AGE 29 y.o.    Provider: Tereso Hope MD     Location [facility]   Service Date: 6/3/2024              [facility]  New Visit Note      Virtual or Telephone Consent  An interactive audio and video telecommunication system which permits real time communications between the patient (at the originating site) and provider (at the distant site) was utilized to provide this telehealth service.   Verbal consent was requested and obtained from Erasto Botello on this date, 24 for a telehealth visit.   I verified the patient's identity and physical location in [place].  If this is a new patient to me, I informed the patient of my name and type of active [place] license that I hold.      The patient's referring provider is: Cesar Díaz MD    HPI:  Erasto Botello is a 29 y.o. male with PMH notable for DM-2, vitamin D deficiency, obesity, asthma, atypical chest pain, and mixed anxiety depressive disorder, who presents today to review the results of his recent sleep study.    He has been waking up with headaches and having episodes of chest pain. Wakes up 3 times per night, always between 4 and 4:30 AM. Sometimes when he falls asleep he wakes himself up from a loud snore. Had a sleep study and is here to review the results.      NIGHTTIME SYMPTOMS:   Snoring: yes, can be loud, possibly nightly, started years ago, possibly worsening over time, not known to be worse when supine. He sleeps on the couch so he does not disturb his girlfriend with his snoring.  Witnessed apnea: No  Nocturnal gasping: a few times  Nocturnal choking: a few times  Sleep walking: No  Sleep talking:  No  Dream enactment: had a dream about an alligator and he was about to fall into the water, then he jerked and he woke up. No injuries to self/others. Happened about a month ago. Other times he has jerked in his sleep. No  punching/kicking.  Sleep paralysis: no  Hypnagogic/hypnopompic hallucinations: no    DAYTIME SYMPTOMS  Daytime sleepiness: when he wakes up he is sleepy, feels more alert when he starts moving.  Has mild sleepiness in the daytime sometimes.  Fatigue: infrequent  Trouble with memory/concentration: sometimes - depends on how he slept at night  Feeling sleepy while driving: not driving anymore due to his anxiety    RLS symptoms: No - but has paresthesias in his feet from diabetes    Cataplexy: No    SLEEP HABITS:   Preferred sleep position: side or supine  Bedtime:  pm, sometimes as late as 12-1 am, sleep latency within a few minutes. Plays a boring game on his phone to put himself to sleep  Wake time: 730-8 am  # of nocturnal awakenings: 4 on avg  Napping: no.   Total estimated sleep per 24 hrs: 7.5 hours    PRIOR SLEEP STUDIES:  -HST 5/22/2024: Weight 134.1 kg, BMI 37.0.  Study showed mild STEVE.  Overall REI3% 14.8/h, REI4% 5.7/h.  Mean SpO2 92%, maurice 84% with 10 minutes spent at or below 88%.     -Per my review, several hypopneas were overscored and did not meet criteria for hypopnea scoring, but certainly the patient has mild STEVE overall.     -Raw PSG data reviewed proceed by me today.  Reviewed test results in detail with the patient. He is agreeable to starting autoCPAP at home.    Patient Active Problem List   Diagnosis    Acne keloidalis nuchae    Mixed anxiety depressive disorder    Asthma (HHS-HCC)    Chest pain    Cough    Diabetes mellitus type 2 without retinopathy (Multi)    History of retinal detachment    Scar of cornea of left eye    Pain of left eye    Myopia of right eye with astigmatism    Phthisis bulbi of left eye    Severe obesity (Multi)    Vitamin D deficiency    Type 2 diabetes mellitus without complication, without long-term current use of insulin (Multi)    Anxiety    Newly diagnosed diabetes (Multi)    Acne keloid    Costochondritis    Folliculitis keloidalis nuchae    Hematuria     Lightheadedness    Myopic astigmatism    Ocular hypertension    Pseudofolliculitis barbae    Rupture of globe     Past Medical History:   Diagnosis Date    Ocular hypertension, 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    Ocular hypertension of left eye    Ocular laceration without prolapse or loss of intraocular tissue, left eye, initial encounter     Ruptured globe, left eye    Personal history of other diseases of the nervous system and sense organs     History of retinal detachment    Personal history of other diseases of the respiratory system     Personal history of asthma    Unspecified astigmatism, unspecified eye 01/23/2018    Myopic astigmatism    Unspecified corneal scar and opacity 01/23/2018    Cornea scar     Past Surgical History:   Procedure Laterality Date    OTHER SURGICAL HISTORY  05/13/2014    Repair Of Rupture Of Eyeball     Current Outpatient Medications   Medication Sig Dispense Refill    benzoyl peroxide 5 % external wash 1 Application      busPIRone (Buspar) 7.5 mg tablet Take 1 tablet (7.5 mg) by mouth 2 times a day. 60 tablet 1    cholecalciferol (Vitamin D3) 50 MCG (2000 UT) tablet Take 1 tablet (50 mcg) by mouth once daily. (Patient not taking: Reported on 5/23/2024) 90 tablet 3    clindamycin (Cleocin T) 1 % external solution 1 Application      Dexcom G7  misc Use as instructed 1 each 0    Dexcom G7 Sensor device Use with DEXCOM device for continuous blood glucose management. Change every 10 days. 3 each 1    dulaglutide (Trulicity) 1.5 mg/0.5 mL pen injector injection Inject 1.5 mg under the skin 1 (one) time per week. 2 mL 2    FreeStyle glucose monitoring (OneTouch Ultra2 Meter) kit USE TO TEST BLOOD SUGAR TWICE DAILY 1 each 0    lancets 30 gauge misc       OneTouch Ultra Test strip Test sugars twice daily 100 strip 11    OneTouch Ultra2 Meter misc USE TO TEST BLOOD SUGAR TWICE DAILY      tiZANidine (Zanaflex) 2 mg tablet Take 1 tablet (2 mg) by mouth 3 times a day for 14 days. 45 tablet 0     No  current facility-administered medications for this visit.     Allergies   Allergen Reactions    Amoxicillin Unknown       FAMILY HISTORY OF SLEEP DISORDERS:   Family History   Problem Relation Name Age of Onset    Anxiety disorder Mother      Depression Mother      Hypertension Mother      Other (embolism) Father      Diabetes Father      Sleep apnea Father      Breast cancer Paternal Grandmother      Sleep apnea Paternal Grandmother      Lung cancer Paternal Grandfather         SOCIAL HISTORY  Employment: sells cars  Lives with: baby daughter and girlfriend  Alcohol: less than once/month  Cigarettes: never  Illicits: none  Caffeine: 1 serving every 1-2 days.     ROS: 12 point ROS positive for fatigue, blurred vision, chest pain, SOB with exercise, headaches, and anxiety. All other systems/items were reviewed and are negative.    PHYSICAL EXAMINATION:   General: Awake. Alert. Comfortable. No apparent distress.   Speech: Normal  Comprehension: Normal  Mood: Stable  Affect: Appropriate  Eyes:   Eyelids: normal            ENT:          Unable to assess patency of nasal passageways or for presence of nasal septum deviation. Tongue scalloping is present, tongue is enlarged, soft palate is not clearly elongated. Uvula is not enlarged. Retrognathia is not present. Tonsils are not visible, but I cannot see much of the back of his throat due to lighting limitations. Dentition very good.           Neck:          Circumference: increased in caliber  Cardiac:  unable to assess pulses or cardiac rate/rhythm.  Pul:          Normal respiratory effort   Abd:         increased central adiposity  Neuro: Alert, well-oriented. Left iris and pupil are very clouded, but otherwise cranial nerves II-XII grossly normal and symmetric.  Moves all limbs symmetrically with no evidence of significant focal weakness. No abnormal movements noted. Normal gait        LABS/DIAGNOSTICS:  Lab Results   Component Value Date    HGB 16.2 05/16/2024    CO2  26 05/16/2024    TSH 0.82 05/16/2024    HGBA1C 5.7 (H) 05/16/2024    VITD25 16 (L) 11/09/2023        ASSESSMENT AND PLAN: Mr. Erasto Botello is a 29 y.o. male with a history of DM-2 and obesity who has at least mild STEVE and he is symptomatic - daytime sleepiness/fatigue, fragmented sleep, and morning headaches.     #STEVE  -We discussed the risk factors for sleep apnea, pathophysiology of sleep apnea, treatment options, and potential long-term complications of untreated STEVE, including cardiovascular and metabolic complications. We will start treatment with autoCPAP 4-20 cm H2O. DME: [facility] office is preferred. Insurance compliance requirements and CPAP setup instructions will be sent via [employer]    #obesity  -weight loss encouraged      All of the above was discussed with the patient in detail. He voiced an understanding of the above and was agreeable to proceed further as advised. Procedure for the sleep study was discussed with him.      FOLLOW UP:  July 19 at 11 AM via video for initial CPAP follow up

## 2024-06-05 DIAGNOSIS — F41.9 ANXIETY: ICD-10-CM

## 2024-06-05 RX ORDER — BUSPIRONE HYDROCHLORIDE 7.5 MG/1
7.5 TABLET ORAL 2 TIMES DAILY
Qty: 180 TABLET | Refills: 1 | Status: SHIPPED | OUTPATIENT
Start: 2024-06-05

## 2024-06-14 ENCOUNTER — APPOINTMENT (OUTPATIENT)
Dept: PRIMARY CARE | Facility: CLINIC | Age: 29
End: 2024-06-14
Payer: COMMERCIAL

## 2024-06-14 DIAGNOSIS — F41.9 ANXIETY: ICD-10-CM

## 2024-06-14 ASSESSMENT — ANXIETY QUESTIONNAIRES
4. TROUBLE RELAXING: NEARLY EVERY DAY
GAD7 TOTAL SCORE: 21
6. BECOMING EASILY ANNOYED OR IRRITABLE: NEARLY EVERY DAY
IF YOU CHECKED OFF ANY PROBLEMS ON THIS QUESTIONNAIRE, HOW DIFFICULT HAVE THESE PROBLEMS MADE IT FOR YOU TO DO YOUR WORK, TAKE CARE OF THINGS AT HOME, OR GET ALONG WITH OTHER PEOPLE: VERY DIFFICULT
5. BEING SO RESTLESS THAT IT IS HARD TO SIT STILL: NEARLY EVERY DAY
1. FEELING NERVOUS, ANXIOUS, OR ON EDGE: NEARLY EVERY DAY
7. FEELING AFRAID AS IF SOMETHING AWFUL MIGHT HAPPEN: NEARLY EVERY DAY
3. WORRYING TOO MUCH ABOUT DIFFERENT THINGS: NEARLY EVERY DAY
2. NOT BEING ABLE TO STOP OR CONTROL WORRYING: NEARLY EVERY DAY

## 2024-06-14 ASSESSMENT — PATIENT HEALTH QUESTIONNAIRE - PHQ9
1. LITTLE INTEREST OR PLEASURE IN DOING THINGS: SEVERAL DAYS
SUM OF ALL RESPONSES TO PHQ9 QUESTIONS 1 & 2: 3
7. TROUBLE CONCENTRATING ON THINGS, SUCH AS READING THE NEWSPAPER OR WATCHING TELEVISION: NEARLY EVERY DAY
2. FEELING DOWN, DEPRESSED OR HOPELESS: MORE THAN HALF THE DAYS
SUM OF ALL RESPONSES TO PHQ QUESTIONS 1-9: 18
8. MOVING OR SPEAKING SO SLOWLY THAT OTHER PEOPLE COULD HAVE NOTICED. OR THE OPPOSITE, BEING SO FIGETY OR RESTLESS THAT YOU HAVE BEEN MOVING AROUND A LOT MORE THAN USUAL: SEVERAL DAYS
10. IF YOU CHECKED OFF ANY PROBLEMS, HOW DIFFICULT HAVE THESE PROBLEMS MADE IT FOR YOU TO DO YOUR WORK, TAKE CARE OF THINGS AT HOME, OR GET ALONG WITH OTHER PEOPLE: VERY DIFFICULT
5. POOR APPETITE OR OVEREATING: NEARLY EVERY DAY
3. TROUBLE FALLING OR STAYING ASLEEP: NEARLY EVERY DAY
4. FEELING TIRED OR HAVING LITTLE ENERGY: MORE THAN HALF THE DAYS
6. FEELING BAD ABOUT YOURSELF - OR THAT YOU ARE A FAILURE OR HAVE LET YOURSELF OR YOUR FAMILY DOWN: MORE THAN HALF THE DAYS
9. THOUGHTS THAT YOU WOULD BE BETTER OFF DEAD, OR OF HURTING YOURSELF: SEVERAL DAYS

## 2024-06-14 NOTE — PROGRESS NOTES
"Collaborative Care (CoCM) Initial Assessment    Session Time  Start: 1134  End: 107     Collaborative Care program information (including case discussion with psychiatry, involvement of [facility] and billing when applicable) was provided and discussed with the patient. Patient Indicated understanding and agreed to proceed.   Confirm: Yes    Patient Health Questionnaire-9 Score: 18 (6/14/2024  1:39 PM)  CLIVE-7 Total Score: 21 (6/14/2024  1:39 PM)        Reason for Visit / Chief Complaint  Chief Complaint   Patient presents with    Anxiety       Accompanied by: Mother   Guardian Status: Sherman Maurer is a 29 year old male referred by Dr. Daíz for anxiety. Patient has a hx of PTSD and anxiety with depression. According to records and patient report, he has a phobia of closed in spaces as well as the police.  Today he states he has increased paranoia since calling a friend who ended up being arrested days later for taking part in a crime. Patient feels that his phone could have been tapped and police may think he took part in the crime as well; this is despite speaking with an ex  as well as his . Patient does not drive in fear of getting into a car accident that could lead to him being taken to half-way.     Review of Symptoms    Sleep   - wakes in the middle of night about 4am  - recently dx with sleep apnea - just started wearing C-pap, doesn't like his mouth and nose being covered with the machine  - \"not really hard to fall asleep\"  - body gets naturally tired around 11, falls asleeps about 12  - used to take melatonin during covid but has not taken anything since     Mood   - often tired -  could be from sleep apnea  - feeling down - a friend was recently arrested, pt has anxiety about police thinking he has something to do with the crime because he spoke with friend right before his arrest   - overall mood \"down\" or \"dark\" because of the call with the friend  - paranoid about police coming to get " "him despite doing anything wrong  - reports he is not futuristic, \"can't see tomorrow-\" started to have hopeless feelings about a month or two ago but does not want to die by suicide  - states he is claustrophobic and would only seriously consider dying by suicide if he would ever have to spend time in longterm      Anxiety   - paranoid about talking to friend on the phone prior to friend being arrested for a murder  - despite speaking with a  and his , pt feels that police recorded the call and he will get in trouble and go to longterm  - looks out of the window several times a day due to paranoia   - mother feels that patient may have PTSD, as he has been anxious for some time.   - at age 7 - cousin was hammering a nail it accidently hit him in his eye as a result lost his eye  - pt reports identifying anxiety after this happened but does not feel he has PTSD from it   - doesn't drive anymore - thinks he will cause an accident and go to longterm   - reports being claustrophobic - will not do anything that can lead up to me being in tight spaces or going to longterm   - doesn't want to watch tv shows with killing anymore  - says he has panic attacks but could not report how often- feels short of breath, heart feels  tight  - pt rates 3 on all CLIVE-7 questions with total score of 21    Self-Esteem / Self-Image   Self Esteem Rating (1-10 Scale, 10 being high): 10  -\"Can lose some weight, but I don't care about that\"     Appetite   - lost 20 lbs in three weeks from worrying about police thinking he has something to do with the same crime his friend was arrested for  - would eat all the time, currently not eating at all  - pt reports yesterday was the first time he had eaten a lot of food, however states he is not hungry today    Trauma    - Saw a lot of police activity growing up for different reason  - has seen several raids on houses from police   - Is paranoid about police officers     Abuse History  Denies    Grief " / Loss / Adjustment   Denies   -  used to think it was his fault his cousin  - from touching fentanyl at a club  - cousin found in restroom and touched it    Hallucinations / Delusions   Hallucinations & Delusions Experienced: none, denied    Learning Concerns / Memory   Learning Concerns & Sx: none, denied  Memory Concerns & Sx: none, denied    Functional impairment   Impacting ADL's: eating   Impacting IADL's: transportation - stops him from driving places  Looks out of the window often due to the paranoia - not thinking someone is coming to kill him but police coming to get him  Used to play call of duty everyday, now can't play game because it has killing in it  Doesn't watch tv shows he used to like - shows  with crime and murder for example    Associated Medical Concerns   Potential Associated Factors: diabetes      Comprehensive Behavioral Health History     Medications  Current Mental Health Medications:   Buspar / buspirone; Dose: 7.5 mg ; Side effects: None, denied  Buspar 7.5 mg - feels like its not working     Past Mental Health Medications:   Paxil / paroxetine; Dose: 40 mg; Side effects: increased anxiousness - stopped taking about 2/3 months ago    - 2019 - start escitalopram oxalate 10mg; hydroxyzine 25mg  - 2021 - Dr. Gusman-   renew escitalopram increased to 20 mg; start Lorazepam (didn't like, made him too sedated),   - 2021 - reportedly doing well with lexapro 20mg  - 2021 - Dr. Pizarro - per not pt has been taking Lexapro and as needed lorazepam however not consistent  - 2023 - taking paxil regularly but not helping - increase paroxetine to 40mg (not seen by psych, renewing px with PCP),, tried Xanax, it was too strong  - 2023 - renew paroxetine for taper schedule had been prescribed max dose with no success- taken off- ordered propranolol 20 mg as needed   - 2024 - lapse in insurance, seen by Dr. Díaz - now off of paroxetine, but interested in trying different med;  started Buspirone trial 7.5  - 6/2024 - reports buspirone is not working, continues to report symptoms of anxiety      Concerns / challenges / barriers with taking medications? No concerns    Open to medication recommendations from consulting psychiatrist? Yes      Mental Health Treatment History  - per chart review patient has been seen by ALEXANDRA Villanueva for therapy from 1/2023-3/2023 - working on an anxiety with driving and phobia of being arrested by police    Risk History  Suicidal Thoughts/Method/Intent/Plan: None, denied  Suicide Attempts/Preparations: None, denied  Number of Suicide Attempts: 0  Access to Firearms/Lethal Means: not stored in locked cabinet  Non-Suicidal Self Injury: None, denied    Violence: None, denied  Homicidal Thoughts/Method/Plan/Intent: None, denied  Homicidal Attempts/Preparations: None, denied  Number of Attempts: 0      Substance Use History    Substances  Occasionally   Family History    Mental Health / Conditions    Family Member Condition / Diagnosis Medications / Side Effects   Mother Anxiety disorder hydroxyzine / Vistaril; Dose: 10  mg; Side effects: None, denied  Paxil / paroxetine; Dose: 20; Side effects: None, denied   Uncle;  paternal schizophrenia Passed away   Uncle;  maternal schizophrenia    Aunt; maternal Depression None/Unknown     Substance Use  Uncle - paternal have all used drugs - one passed, one sober 50+ years   Father - spurts of alcoholism     History of Suicide  Denies       Social History    Housing   Living Situation:  lives with mom and younger sister Marsha, 18 spends  a lot of time at girlfriends house with daughter  Safe Housing Conditions / Feels Safe in Home: Yes    Employment  Current Employment: employed  Current Concerns/Challenges: No  - works at a car lot , selling cars     Income   Current Concerns/Challenges: No  Receive Benefits/Assistance: No    Education   Status / Level of Education: Some college    Legal   Legal Considerations:  None, denied    Relationships   S/O:  girlfiriend- gets along well, together since high school  Parents/Guardian: - gets along well with mother   Siblings: one older brother, one younger sister - on 5 half siblings with father   Friends: supportive group of friends   Other: has a 4 year old daughter    Sexuality / Gender   Concerns with Sexuality/Gender: None, denied  Sexual Orientation: heterosexual    Transportation   Transportation Concerns: does not drive    Sikh/ Spirituality   Are you Episcopalian or Spiritual: Yes  Sikh / Practice: Jain  - prays    Coping / Strengths / Supports   Coping:  gambling, talking on phone  Strengths:  per mom-  heis a very family oriented person; good father, caring  Supports: Mother, uncle          Assessment Summary  / Plan    Assessment Summary:  What do you want to work on/get out of collaborative care? Patient reports he wants to lessen his symptoms of anxiety.  Would like to get better sleep     Plan:   Psych consult - ongoing, bi-weekly, Xloyhco-[id number]-[id number] interventions, provide psycho-education, and provide appropriate tx referrals    No follow-ups on file.    Provisional Findings / Impressions  Primary: Patient has a history of anxiety and depression. Patient reports a fear of small places, especially long-term. He does not drive and fears interaction with the police. According to patient's chart, he has had a few session of therapy and was referred to group counseling (chart notes indicate inactive status for both- he did not attend group and stopped going to therapist). Patient as taken different medications in the past,  however recently taken himself off of 40 mg of paxil. Patient reports that Buspar is not currently working at this time.       Goals  Psychoeducation  Cognitive Restructuring  Relaxation strategies

## 2024-06-17 DIAGNOSIS — F41.9 ANXIETY: Primary | ICD-10-CM

## 2024-06-17 RX ORDER — ESCITALOPRAM OXALATE 10 MG/1
10 TABLET ORAL DAILY
Qty: 90 TABLET | Refills: 0 | Status: SHIPPED | OUTPATIENT
Start: 2024-06-17 | End: 2024-09-15

## 2024-06-17 NOTE — PROGRESS NOTES
Patient verbally informed that body search would be performed to  remove any items that may be potentially used for self harm or suicidal behavior, ensure that no potentially dangerous mind or mood altering drugs were being introduced into treatment environment, remove any items that may pose a risk for personal safety due to thought or mood disorder, ensure that no items were being introduced into treatment environment that may interfere with eating disorder plan of care and advised about what would occur during the search process.  Patient denies being in possession of potentially dangerous items.  The patient was provided with an opportunity to ask questions or voice any concerns related to the body surface search prior to it being performed.  The patient agreed to participate in the search process.  Body surface search was conducted by two staff members: (Stephie LIVINGSTON, Raegan WOODS, Deepthi WOODS).  Patient response to search process was Cooperative with no adverse physical or psychological response.  Contraband was not found during the search process.    New start Lexapro for anxiety.     Cesar Díaz MD

## 2024-06-21 ENCOUNTER — SOCIAL WORK (OUTPATIENT)
Dept: PRIMARY CARE | Facility: CLINIC | Age: 29
End: 2024-06-21
Payer: COMMERCIAL

## 2024-06-21 NOTE — PROGRESS NOTES
Collaborative Care (CoCM)  Progress Note    Type of Interaction: Phone    Start Time: 1126    End Time: 1137        Appointment: Scheduled    Reason for Visit:   Chief Complaint   Patient presents with    Anxiety          Interval History / Patient Symptoms:     Patient Health Questionnaire-9 Score: 18 (6/14/2024  1:39 PM)  CLIVE-7 Total Score: 21 (6/14/2024  1:39 PM)        Interventions Provided:  Check in      Response to Intervention:   [email or web address]  - pt states he feels less paranoid about specific situation he discussed last week, however has a new situation to be paranoid about  - reassured patient that he did nothing wrong, despite agreeing, he continues to perseverate, thinking police are after him  - pt states he continues to look out of the window several times a day and added that he needed to go back and forth from the door to the car to make sure he didn't do anything the police would think was wrong.   - [facility] will email OC screener for OCD- pt states he has been told in the past that he may have OCD but has never been dx  - pt has not started escitalopram, has plan to  from pharmacy today      Plan:  complete OCD screener and fax back to [facility]      Follow Up / Next Appointment: 6/28

## 2024-06-24 ENCOUNTER — DOCUMENTATION (OUTPATIENT)
Dept: BEHAVIORAL HEALTH | Facility: CLINIC | Age: 29
End: 2024-06-24
Payer: COMMERCIAL

## 2024-06-24 NOTE — PROGRESS NOTES
"[facility] Psychiatry Consult Note     Erasto Botello is a 29 y.o., referred to Collaborative Care for symptoms of depression and anxiety. I have reviewed the patient with the behavioral health manager and reviewed the patient's electronic record.    Recommendations:   -- cognitive restructuring, grounding/relaxation strategies;   -- consider increasing SSRI to 20mg daily.  May increase to max 30mg daily if no history of arrhythmias    Mood: \"tired,\" \"down,\" +hopeless, denies SI but would kill himself if he were ever sent to [facility] due to being claustrophobic and not able to tolerate a small cell  Anxiety: constantly worries that police will come after him despite not committing any crimes  Sleep: middle insomnia, h/o STEVE  Appetite: decreased since friend was murdered  [place]: witnessed police raids of neighborhood homes; nail punctured pt's eye at 8yo--> has glass eye; cousin  while pt and cousin were at a club due to touching fentanyl and now pat blames himself;    PPH:  MDD, CLIVE, PTSD  Current meds: buspirone 7.5mg, escitalopram 10mg daily  Past meds: paroxetine 40mg; hydroxyzine; lorazepam; propranolol ordered (taken?)    FHX: mom with anxiety-- paroxetine, hydroxyzine; paternal and maternal uncles-- schizophrenia; maternal aunt- depression; father-- alcoholism    SHX:  Lives with mom; has gf    Patient Health Questionnaire-9 Score: 18 (2024  1:39 PM)  CLIVE-7 Total Score: 21 (2024  1:39 PM)      The above treatment considerations and suggestions are based on consultations with the patient's care manager and a review of information available in the electronic medical record. I have not personally examined the patient. All recommendations should be implemented with consideration of the patient's relevant prior history and current clinical status. Please feel free to call me with any questions about the care of this patient. I can easily be reached through Epic inbox, or  email      "

## 2024-06-25 DIAGNOSIS — F41.9 ANXIETY: ICD-10-CM

## 2024-06-25 RX ORDER — ESCITALOPRAM OXALATE 10 MG/1
10 TABLET ORAL DAILY
Qty: 90 TABLET | Refills: 0 | Status: SHIPPED | OUTPATIENT
Start: 2024-06-25 | End: 2024-09-23

## 2024-06-28 ENCOUNTER — DOCUMENTATION (OUTPATIENT)
Dept: PRIMARY CARE | Facility: CLINIC | Age: 29
End: 2024-06-28

## 2024-06-28 ENCOUNTER — APPOINTMENT (OUTPATIENT)
Dept: PRIMARY CARE | Facility: CLINIC | Age: 29
End: 2024-06-28
Payer: COMMERCIAL

## 2024-06-28 DIAGNOSIS — F41.9 ANXIETY: Primary | ICD-10-CM

## 2024-06-28 ASSESSMENT — PATIENT HEALTH QUESTIONNAIRE - PHQ9
1. LITTLE INTEREST OR PLEASURE IN DOING THINGS: SEVERAL DAYS
10. IF YOU CHECKED OFF ANY PROBLEMS, HOW DIFFICULT HAVE THESE PROBLEMS MADE IT FOR YOU TO DO YOUR WORK, TAKE CARE OF THINGS AT HOME, OR GET ALONG WITH OTHER PEOPLE: SOMEWHAT DIFFICULT
4. FEELING TIRED OR HAVING LITTLE ENERGY: SEVERAL DAYS
7. TROUBLE CONCENTRATING ON THINGS, SUCH AS READING THE NEWSPAPER OR WATCHING TELEVISION: NOT AT ALL
6. FEELING BAD ABOUT YOURSELF - OR THAT YOU ARE A FAILURE OR HAVE LET YOURSELF OR YOUR FAMILY DOWN: SEVERAL DAYS
8. MOVING OR SPEAKING SO SLOWLY THAT OTHER PEOPLE COULD HAVE NOTICED. OR THE OPPOSITE, BEING SO FIGETY OR RESTLESS THAT YOU HAVE BEEN MOVING AROUND A LOT MORE THAN USUAL: NOT AT ALL
SUM OF ALL RESPONSES TO PHQ QUESTIONS 1-9: 5
SUM OF ALL RESPONSES TO PHQ9 QUESTIONS 1 & 2: 2
9. THOUGHTS THAT YOU WOULD BE BETTER OFF DEAD, OR OF HURTING YOURSELF: NOT AT ALL
5. POOR APPETITE OR OVEREATING: SEVERAL DAYS
2. FEELING DOWN, DEPRESSED OR HOPELESS: SEVERAL DAYS
3. TROUBLE FALLING OR STAYING ASLEEP: NOT AT ALL

## 2024-06-28 ASSESSMENT — ANXIETY QUESTIONNAIRES
4. TROUBLE RELAXING: SEVERAL DAYS
GAD7 TOTAL SCORE: 6
5. BEING SO RESTLESS THAT IT IS HARD TO SIT STILL: SEVERAL DAYS
7. FEELING AFRAID AS IF SOMETHING AWFUL MIGHT HAPPEN: NOT AT ALL
IF YOU CHECKED OFF ANY PROBLEMS ON THIS QUESTIONNAIRE, HOW DIFFICULT HAVE THESE PROBLEMS MADE IT FOR YOU TO DO YOUR WORK, TAKE CARE OF THINGS AT HOME, OR GET ALONG WITH OTHER PEOPLE: SOMEWHAT DIFFICULT
3. WORRYING TOO MUCH ABOUT DIFFERENT THINGS: NOT AT ALL
6. BECOMING EASILY ANNOYED OR IRRITABLE: SEVERAL DAYS
2. NOT BEING ABLE TO STOP OR CONTROL WORRYING: SEVERAL DAYS
1. FEELING NERVOUS, ANXIOUS, OR ON EDGE: MORE THAN HALF THE DAYS

## 2024-06-28 NOTE — PROGRESS NOTES
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, long hours, working on commission  - Monday - walked across the street from work - pt reports \"I feel like I'm hurting someone or something when I'm walking\" - knows this is irrational but had to walk the path he walked several times before believing nothing happened - uncle kept taking him back to try to keep him from feeling this way  - Tuesdsay - had breakdown at work - was \"freaking out\", \"acting crazy\", paranoid after going back across the street to get breakfast and seeing and hearing the police at the location requesting video from the night before- spoke with his step mother who works in  she suggested he go to [facility] - they had no open beds and had people in front of him since he was not suicidal or on mood altering drugs  - while there pt needed to be \"buzzed\" out of a door, this made him catastrophic so he left the facility without speaking to any one further    - called  psychiatry who referred him to [facility]- virtually met with Renuka Son CNP  who increased his Buspar to 10 mg and added 50 mg of Sertraline   - pt reports he has not been able to  lexapro prescribed by PCP- he was advised to not pursue this route any longer as 2 providers should not be prescribing at the same time.   - pt was also advised to inquire about individual therapy at the location as well- pt would benefit " "from more intensive therapy services  - pt did not complete OCD screener  - plays tank for fun- goes to this when he feels anxious, in person and on jennyfer  - discussed \"putting thought on trial\" - provided worksheet to assist patient with thinking through his irrational thoughts  - educated pt and provided handout for grounding techniques    - pt asked that a letter be written to his employer about seeking therapy as he had missed several days of work- letter will be scanned into chart    Plan:  Practice at least 3 grounding techniques, use technique putting thoughts on trial to challenge thoughts, consider seeking employment that align better with values and life goals      Follow Up / Next Appointment: 7/12      "

## 2024-07-01 LAB — BODY SURFACE AREA: 2.64 M2

## 2024-07-08 LAB — BODY SURFACE AREA: 2.64 M2

## 2024-07-14 DIAGNOSIS — E11.9 DIABETES MELLITUS TYPE 2 WITHOUT RETINOPATHY (MULTI): ICD-10-CM

## 2024-07-14 RX ORDER — BLOOD-GLUCOSE SENSOR
EACH MISCELLANEOUS
Qty: 3 EACH | Refills: 6 | Status: SHIPPED | OUTPATIENT
Start: 2024-07-14

## 2024-07-15 ENCOUNTER — APPOINTMENT (OUTPATIENT)
Dept: CARDIOLOGY | Facility: HOSPITAL | Age: 29
End: 2024-07-15
Payer: COMMERCIAL

## 2024-07-15 DIAGNOSIS — R07.9 CHEST PAIN, UNSPECIFIED TYPE: ICD-10-CM

## 2024-07-16 ENCOUNTER — APPOINTMENT (OUTPATIENT)
Dept: PRIMARY CARE | Facility: CLINIC | Age: 29
End: 2024-07-16
Payer: COMMERCIAL

## 2024-07-16 DIAGNOSIS — R07.9 CHEST PAIN, UNSPECIFIED TYPE: ICD-10-CM

## 2024-07-16 DIAGNOSIS — F41.9 ANXIETY: Primary | ICD-10-CM

## 2024-07-16 DIAGNOSIS — E11.9 DIABETES MELLITUS TYPE 2 WITHOUT RETINOPATHY (MULTI): ICD-10-CM

## 2024-07-16 DIAGNOSIS — G47.33 OSA (OBSTRUCTIVE SLEEP APNEA): ICD-10-CM

## 2024-07-16 PROCEDURE — 3044F HG A1C LEVEL LT 7.0%: CPT | Performed by: STUDENT IN AN ORGANIZED HEALTH CARE EDUCATION/TRAINING PROGRAM

## 2024-07-16 PROCEDURE — 99213 OFFICE O/P EST LOW 20 MIN: CPT | Performed by: STUDENT IN AN ORGANIZED HEALTH CARE EDUCATION/TRAINING PROGRAM

## 2024-07-16 PROCEDURE — 3061F NEG MICROALBUMINURIA REV: CPT | Performed by: STUDENT IN AN ORGANIZED HEALTH CARE EDUCATION/TRAINING PROGRAM

## 2024-07-16 PROCEDURE — G2211 COMPLEX E/M VISIT ADD ON: HCPCS | Performed by: STUDENT IN AN ORGANIZED HEALTH CARE EDUCATION/TRAINING PROGRAM

## 2024-07-16 NOTE — PROGRESS NOTES
Erasto Botello is a 29 y.o. male seen in Clinic at /[facility] by Dr. Cesar Díaz on 07/16/24 for routine care, as well as for management of the following chronic medical conditions: T2DM, anxiety, PTSD, STEVE. He presents today for follow up visit virtually.     Anxiety ongoing, recently established with psychiatry. Transitioned to Zoloft from Lexapro, which he just started yesterday. Notes some nausea. Started at 50mg dosing, Discussed possibly trial of only 25mg dosing for the first week with slow titration due to his predisposition toward side effects.     Cardiac symptoms much improved since last visit without ongoing issues.     Continues on buspirone with good effect. Will defer titration at this time given recent new start Zoloft.     Labs from May reviewed with overall reassuring results, A1C 5.7% from 5.9% in 11/2023. CBC with mild leukocytosis (somewhat chronic for patient, reassuring differential). Will repeat with next labs for surveillance.     UPDATES 7/16/24:  - Psychiatry through Sertraline 50mg   - Nausea with medication   [  ] advise trying lower dose 25mg; consider trying with food     #Chest Pain--MUCH IMPROVED  - remote negative stress testing  - reassuring acute cardiac evaluation in ED  - NOT correlated to activity; typically during period of rest  [x] Holter, echo to further characterize: reassuring holter, mildly elevated RVSP  [x] updated labs: reassuring 05/2024 as above     #STEVE  - per home sleep study in 05/2024  - mild-to-moderate  [  ] pending sleep med visit in 07/2024     #T2DM  #Obesity   - follows with pharmacy   - last A1C 5.7% in 05/2024  - current regimen: Trulicity 1.5mg weekly   - reassuring baseline renal function  - urine albumin reassuring in 05/2024   - annual optho advised     #Anxiety   #PTSD  #Concern for OCD  - following with psych, behavioral health   - prior Paroxetine, Escitalopram  - currently on Buspirone, new start Sertraline as above     #Lipid Management    - cholesterol overall good, LDL in 90s per labs 11/2023    - defer statin for now given DM otherwise well controlled  - lifestyle modification, diet with lot of room for improvement; prior dietician referral     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    Ocular hypertension of left eye    Ocular laceration without prolapse or loss of intraocular tissue, left eye, initial encounter     Ruptured globe, left eye    Personal history of other diseases of the nervous system and sense organs     History of retinal detachment    Personal history of other diseases of the respiratory system     Personal history of asthma    Unspecified astigmatism, unspecified eye 01/23/2018    Myopic astigmatism    Unspecified corneal scar and opacity 01/23/2018    Cornea scar     Subspecialty Medical Care: Psychiatry    Past Surgical History:   Past Surgical History:   Procedure Laterality Date    OTHER SURGICAL HISTORY  05/13/2014    Repair Of Rupture Of Eyeball     Surgery/Location/Date:     Medications:   Current Outpatient Medications:     benzoyl peroxide 5 % external wash, 1 Application, Disp: , Rfl:     blood-glucose sensor (Dexcom G7 Sensor) device, USE WITH DEXCOM DEVICE FOR CONTINUOUS BLOOD GLUCOSE MANAGEMENT. CHANGE EVERY 10 DAYS., Disp: 3 each, Rfl: 6    busPIRone (Buspar) 7.5 mg tablet, TAKE 1 TABLET BY MOUTH 2 TIMES A DAY, Disp: 180 tablet, Rfl: 1    cholecalciferol (Vitamin D3) 50 MCG (2000 UT) tablet, Take 1 tablet (50 mcg) by mouth once daily. (Patient not taking: Reported on 6/3/2024), Disp: 90 tablet, Rfl: 3    clindamycin (Cleocin T) 1 % external solution, 1 Application, Disp: , Rfl:     Dexcom G7  misc, Use as instructed, Disp: 1 each, Rfl: 0    dulaglutide (Trulicity) 1.5 mg/0.5 mL pen injector injection, Inject 1.5 mg under the skin 1 (one) time per week., Disp: 2 mL, Rfl: 2    FreeStyle glucose monitoring (OneTouch Ultra2 Meter) kit, USE TO  TEST BLOOD SUGAR TWICE DAILY, Disp: 1 each, Rfl: 0    lancets 30 gauge misc, , Disp: , Rfl:     OneTouch Ultra Test strip, Test sugars twice daily, Disp: 100 strip, Rfl: 11    OneTouch Ultra2 Meter misc, USE TO TEST BLOOD SUGAR TWICE DAILY, Disp: , Rfl:     sertraline (Zoloft) 50 mg tablet, Take 1 tablet (50 mg) by mouth once daily., Disp: , Rfl:   -Does not take citalopram or vitamin D    Pharmacy: [facility]    Allergies:   Allergies   Allergen Reactions    Amoxicillin Unknown     Immunizations:   - Flu UTD 2023  - Tdap 2023  - Prevnar-20: 2023     Family History:   Family History   Problem Relation Name Age of Onset    Anxiety disorder Mother      Depression Mother      Hypertension Mother      Other (embolism) Father      Diabetes Father      Sleep apnea Father      Breast cancer Paternal Grandmother      Sleep apnea Paternal Grandmother      Lung cancer Paternal Grandfather       Social History:   Home/Living Situation: Lives at home with daughter  Education: Diploma, some college  Employment/Work/Vocational: Works at a school  Activities: see family, play games  Drug Use: None  Alcohol: Occasional  Tobacco: Denies  Diet: Switches between health/unhealthy meals  Exercise: Walking at work  Sexuality: Currently sexually active with women.  Suicide/Depression/Anxiety: Has anxiety. No depression or suicide  Sleep: Sleeps well    Visit Vitals  Smoking Status Never      PHYSICAL EXAM:   VIRTUAL VISIT: patient clear minded, pleasant, and in NAD during encounter; full exam from last in person visit below for reference    General: well appearing AA male, NAD, pleasant and engaged in encounter    HEENT: NCAT, MMM, opaque left eye  CV: RRR, no m/r/g  PULM: CTAB, non-labored respirations   ABD: soft, obese, NT, ND  : no suprapubic or CVA tenderness   EXT: WWP, no significant edema   SKIN: no rashes noted   NEURO: A&Ox4, symmetric facies, no gross motor or sensory deficits, normal gait  PSYCH:  pleasant mood, appropriate affect     Assessment/Plan    Erasto Botello is a 28 y.o. male seen in Clinic at /[facility] by Dr. Cesar Díaz on 5/14/2024 for routine care, as well as for management of the following chronic medical conditions: T2DM, anxiety, PTSD, STEVE. He presents today for follow up visit virtually.     Anxiety ongoing, recently established with psychiatry. Transitioned to Zoloft from Lexapro, which he just started yesterday. Notes some nausea. Started at 50mg dosing, Discussed possibly trial of only 25mg dosing for the first week with slow titration due to his predisposition toward side effects.     Cardiac symptoms much improved since last visit without ongoing issues.     Continues on buspirone with good effect. Will defer titration at this time given recent new start Zoloft.     Labs from May reviewed with overall reassuring results, A1C 5.7% from 5.9% in 11/2023. CBC with mild leukocytosis (somewhat chronic for patient, reassuring differential). Will repeat with next labs for surveillance.     UPDATES 7/16/24:  - Psychiatry through Sertraline 50mg   - Nausea with medication   [  ] advise trying lower dose 25mg; consider trying with food     #Chest Pain--MUCH IMPROVED  - remote negative stress testing  - reassuring acute cardiac evaluation in ED  - NOT correlated to activity; typically during period of rest  [x] Holter, echo to further characterize: reassuring holter, mildly elevated RVSP  [x] updated labs: reassuring 05/2024 as above     #STEVE  - per home sleep study in 05/2024  - mild-to-moderate  [  ] pending sleep med visit in 07/2024     #T2DM  #Obesity   - follows with pharmacy   - last A1C 5.7% in 05/2024  - current regimen: Trulicity 1.5mg weekly   - reassuring baseline renal function  - urine albumin reassuring in 05/2024   - annual optho advised     #Anxiety   #PTSD  #Concern for OCD  - following with psych, behavioral health   - prior Paroxetine, Escitalopram  - currently on  Buspirone, new start Sertraline as above     #Lipid Management   - cholesterol overall good, LDL in 90s per labs 11/2023    - defer statin for now given DM otherwise well controlled  - lifestyle modification, diet with lot of room for improvement; prior dietician referral     #Health Maintenance   - Immunizations: Tdap 2023-->due 2033, PCV-20 in 2023 (T2DM status)   - Vision, Hearing screens: recommend following with optho given history   - Counseling regarding alcohol/tobacco/drug use: not needed  - Depression screen: anxiety as above, psych and behavioral health engaged   - BMI, Lipid, A1C screening and nutritional/exercise counseling: last 05/2024   - Blood Pressure: WNL at last in person visit   - Safe Sexual Practices, STI, HIV screening: deferred at last CPE     Return to clinic for follow-up in 3 months.     Cesar Díaz MD  Internal Medicine-Pediatrics   [facility] [address], [address]  P: [phone], F: [phone]

## 2024-07-17 RX ORDER — SERTRALINE HYDROCHLORIDE 50 MG/1
50 TABLET, FILM COATED ORAL DAILY
COMMUNITY

## 2024-07-19 ENCOUNTER — APPOINTMENT (OUTPATIENT)
Dept: SLEEP MEDICINE | Facility: CLINIC | Age: 29
End: 2024-07-19
Payer: COMMERCIAL

## 2024-07-19 DIAGNOSIS — G47.33 OBSTRUCTIVE SLEEP APNEA (ADULT) (PEDIATRIC): Primary | ICD-10-CM

## 2024-07-19 PROCEDURE — 3061F NEG MICROALBUMINURIA REV: CPT | Performed by: PSYCHIATRY & NEUROLOGY

## 2024-07-19 PROCEDURE — 3044F HG A1C LEVEL LT 7.0%: CPT | Performed by: PSYCHIATRY & NEUROLOGY

## 2024-07-19 PROCEDURE — 99212 OFFICE O/P EST SF 10 MIN: CPT | Performed by: PSYCHIATRY & NEUROLOGY

## 2024-07-19 NOTE — PROGRESS NOTES
Patient: Erasto Botello    [id number]  : 1995 -- AGE 29 y.o.    Provider: Tereso Hope MD     [facility]   Service Date: 2024              [facility]  Follow-up Note      Virtual or Telephone Consent  An interactive audio and video telecommunication system which permits real time communications between the patient (at the originating site) and provider (at the distant site) was utilized to provide this telehealth service.   Verbal consent was requested and obtained from Erasto Botello on this date, 24 for a telehealth visit.   I verified the patient's identity and physical location in [place].  If this is a new patient to me, I informed the patient of my name and type of active [place] license that I hold.      HPI: Erasto Botello is a 29 y.o. male with mild STEVE recently started on autoCPAP. PMH notable for DM-2, vitamin D deficiency, obesity, asthma, atypical chest pain, and mixed anxiety depressive disorder. His presenting sleep complaints on 6/3/2024 included snoring, occasional gasping/choking at night, frequent waking with headaches and chest pain, multiple nocturnal awakenings, and mild daytime sleepiness. He is here today for a follow up visit.     He was 15 minutes late joining our 20 minute appointment time. I did not have time to review his medication list with him today.    He is having trouble acclimating to CPAP due to the air pressure and air rushes out of his mouth when he opens his mouth with a nasal mask. Has anxiety and claustrophobia with his hybrid face mask.     He is comfortable with not changing his pressure settings.      PAP DEVICE   DME: [employer]  Setup date: 24  Type: AirSense 11 autoCPAP  Settin-20 cm H2O    MASK  Type: F30 med  Fit: good    Prior Sleep studies:   -HST 2024: Weight 134.1 kg, BMI 37.0.  Study showed mild STEVE.  Overall REI3% 14.8/h, REI4% 5.7/h.  Mean SpO2 92%, maurice 84% with 10  minutes spent <=88%.     -Per my review, several hypopneas were overscored and did not meet criteria for hypopnea scoring, but certainly the patient has mild STEVE overall.              REVIEW OF MACHINE DOWNLOAD:   Date Range: 24-24  % Days used: 39%  % Days with Usage >= 4 hrs: 12%  Average usage days used: 3 h 5 min   Settin-20 cmH2O, EPR 3  95th percentile pressure: 6.1 cmH2O, median pressure 4.7 cmH2O, max pressure 6.6 cmH2O  95th percentile leak: 4.0 LPM  Residual AHI: 0.3      Patient Active Problem List   Diagnosis    Acne keloidalis nuchae    Mixed anxiety depressive disorder    Asthma ([facility])    Chest pain    Cough    Diabetes mellitus type 2 without retinopathy (Multi)    History of retinal detachment    Scar of cornea of left eye    Pain of left eye    Myopia of right eye with astigmatism    Phthisis bulbi of left eye    Severe obesity (Multi)    Vitamin D deficiency    Type 2 diabetes mellitus without complication, without long-term current use of insulin (Multi)    Anxiety    Newly diagnosed diabetes (Multi)    Acne keloid    Costochondritis    Folliculitis keloidalis nuchae    Hematuria    Lightheadedness    Myopic astigmatism    Ocular hypertension    Pseudofolliculitis barbae    Rupture of globe     Past Medical History:   Diagnosis Date    Ocular hypertension, left eye 2019    Ocular hypertension of left eye    Ocular laceration without prolapse or loss of intraocular tissue, left eye, initial encounter     Ruptured globe, left eye    Personal history of other diseases of the nervous system and sense organs     History of retinal detachment    Personal history of other diseases of the respiratory system     Personal history of asthma    Unspecified astigmatism, unspecified eye 2018    Myopic astigmatism    Unspecified corneal scar and opacity 2018    Cornea scar     Past Surgical History:   Procedure Laterality Date    OTHER SURGICAL HISTORY  2014    Repair Of  Rupture Of Eyeball     Current Outpatient Medications on File Prior to Visit   Medication Sig Dispense Refill    benzoyl peroxide 5 % external wash 1 Application      blood-glucose sensor (Dexcom G7 Sensor) device USE WITH DEXCOM DEVICE FOR CONTINUOUS BLOOD GLUCOSE MANAGEMENT. CHANGE EVERY 10 DAYS. 3 each 6    busPIRone (Buspar) 7.5 mg tablet TAKE 1 TABLET BY MOUTH 2 TIMES A  tablet 1    cholecalciferol (Vitamin D3) 50 MCG (2000 UT) tablet Take 1 tablet (50 mcg) by mouth once daily. (Patient not taking: Reported on 6/3/2024) 90 tablet 3    clindamycin (Cleocin T) 1 % external solution 1 Application      Dexcom G7  misc Use as instructed 1 each 0    dulaglutide (Trulicity) 1.5 mg/0.5 mL pen injector injection Inject 1.5 mg under the skin 1 (one) time per week. 2 mL 2    FreeStyle glucose monitoring (OneTouch Ultra2 Meter) kit USE TO TEST BLOOD SUGAR TWICE DAILY 1 each 0    lancets 30 gauge misc       OneTouch Ultra Test strip Test sugars twice daily 100 strip 11    OneTouch Ultra2 Meter misc USE TO TEST BLOOD SUGAR TWICE DAILY      sertraline (Zoloft) 50 mg tablet Take 1 tablet (50 mg) by mouth once daily.      [DISCONTINUED] Dexcom G7 Sensor device Use with DEXCOM device for continuous blood glucose management. Change every 10 days. 3 each 1    [DISCONTINUED] escitalopram (Lexapro) 10 mg tablet Take 1 tablet (10 mg) by mouth once daily. 90 tablet 0    [DISCONTINUED] tiZANidine (Zanaflex) 2 mg tablet Take 1 tablet (2 mg) by mouth 3 times a day for 14 days. 45 tablet 0     No current facility-administered medications on file prior to visit.       PHYSICAL EXAMINATION:   General: Awake. Alert. Comfortable. No apparent distress.   Speech: Normal.  Comprehension: Normal.  Mood: Stable.  Affect: Appropriate.  Pul:         Normal respiratory effort.   Neuro: Alert, well-oriented.     ASSESSMENT AND PLAN: Mr. Erasto Botello is a 29 y.o. male with mild STEVE having trouble acclimating to CPAP.      #STEVE -  "not meeting compliance minimum yet. Needs to desensitize to using CPAP, either by practicing wearing his full face mask more, or he could probably better tolerate it by using his nasal mask along with something to prevent mouth-breathing - either a chin strap or tape (eg. \"Hostage Tape\"  -reminded pt of the specifics of what insurance requires for compliance  -advised pt to talk to DME if he has mask issues, as well      All of the above was discussed with the patient in detail. He voiced an understanding of the above and was agreeable to proceed further as advised.     Around 14 minutes were spent with the patient plus time spent reviewing the chart, updating the chart as needed, and documenting.     FOLLOW UP: Aug 9 at 10 AM via video for CPAP compliance check  "

## 2024-08-09 ENCOUNTER — APPOINTMENT (OUTPATIENT)
Dept: SLEEP MEDICINE | Facility: CLINIC | Age: 29
End: 2024-08-09
Payer: COMMERCIAL

## 2024-08-09 DIAGNOSIS — G47.33 OBSTRUCTIVE SLEEP APNEA (ADULT) (PEDIATRIC): Primary | ICD-10-CM

## 2024-08-09 DIAGNOSIS — E66.01 CLASS 2 SEVERE OBESITY DUE TO EXCESS CALORIES WITH SERIOUS COMORBIDITY AND BODY MASS INDEX (BMI) OF 36.0 TO 36.9 IN ADULT (MULTI): ICD-10-CM

## 2024-08-09 PROCEDURE — 99213 OFFICE O/P EST LOW 20 MIN: CPT | Performed by: PSYCHIATRY & NEUROLOGY

## 2024-08-09 PROCEDURE — 3044F HG A1C LEVEL LT 7.0%: CPT | Performed by: PSYCHIATRY & NEUROLOGY

## 2024-08-09 PROCEDURE — 3061F NEG MICROALBUMINURIA REV: CPT | Performed by: PSYCHIATRY & NEUROLOGY

## 2024-08-09 NOTE — PROGRESS NOTES
Patient: Erasto Botello    [id number]  : 1995 -- AGE 29 y.o.    Provider: Tereso Hope MD     [facility]   Service Date: 2024              [facility]  Follow-up Note      Virtual or Telephone Consent  An interactive audio and video telecommunication system which permits real time communications between the patient (at the originating site) and provider (at the distant site) was utilized to provide this telehealth service.   Verbal consent was requested and obtained from Erasto Botello on this date, 24 for a telehealth visit.   I verified the patient's identity and physical location in [place].  If this is a new patient to me, I informed the patient of my name and type of active [place] license that I hold.          HPI: Erasto Botello is a 29 y.o. male with mild STEVE recently started on autoCPAP. PMH notable for DM-2, vitamin D deficiency, obesity, asthma, atypical chest pain, and mixed anxiety depressive disorder. His presenting sleep complaints on 6/3/2024 included snoring, occasional gasping/choking at night, frequent waking with headaches and chest pain, multiple nocturnal awakenings, and mild daytime sleepiness. He is here today for a follow up visit.  Last visit on 2024 he was was having difficulty adequately Due to ear pressure and a rushing in his mouth when he opens his mouth with his nasal mask, and reported anxiety claustrophobia with a hybrid face mask.  He was advised to consider using either mouth tape or chinstrap to prevent mouth breathing with his nasal mask and to speak to his DME regarding mask options.    Today he is on day 64 of his 90 day compliance period with CPAP.    A few nights he woke up with his mask off, taking it off during sleep. He has been sick for the last few days and has congestion and cannot easily use CPAP at this time.    He can fall asleep with CPAP, but finds the mask to be tight and causes some  claustrophobia.   Mask Type: F30 medium. Tried a nasal mask, which he found hard to breathe with due to air rushing out of his mouth when he opened his mouth during sleep  Fit: good, but he adjusts it as needed in the night    Some nights he falls asleep watching TV before being able to go to the bedroom to use CPAP, and other nights he falls asleep in bed before putting on his mask.    Not able to move as much in bed due to CPAP and the hose somewhat restricting his ability to easily reposition in bed.    Not always getting benefit from CPAP, but did initially. He has not gotten a full night of sleep with it.    Has sensitive skin and his skin is breaking out where his CPAP straps touch his face.    Walks a mile each morning since the first week of . Diet could be better. Not sure if he has lost weight.    Often naps for about an hour in the afternoon, not using CPAP when napping.    He is motivated to improve his PAP compliance.    PAP DEVICE   DME: [employer]  Setup date: 24  Type: AirSense 11 autoCPAP  Settin-20 cm H2O      Prior Sleep studies:   -HST 2024: Weight 134.1 kg, BMI 37.0.  Study showed mild STEVE.  Overall REI3% 14.8/h, REI4% 5.7/h.  Mean SpO2 92%, maurice 84% with 10 minutes spent <=88%.     -Per my review, several hypopneas were overscored and did not meet criteria for hypopnea scoring, but certainly the patient has mild STEVE overall.              REVIEW OF MACHINE DOWNLOAD:   Date Range: 24-24  % Days used: 43%  % Days with Usage >= 4 hrs: 19%  Average usage days used: 3 h 8 min   Settin-20 cmH2O, EPR 3  95th percentile pressure: 6.0 cmH2O, median pressure 4.5 cmH2O, max pressure 7.2 cmH2O  95th percentile leak: 3.2 LPM  Residual AHI: 0.1    Patient Active Problem List   Diagnosis    Acne keloidalis nuchae    Mixed anxiety depressive disorder    Asthma (HHS-HCC)    Chest pain    Cough    Diabetes mellitus type 2 without retinopathy (Multi)    History of retinal  detachment    Scar of cornea of left eye    Pain of left eye    Myopia of right eye with astigmatism    Phthisis bulbi of left eye    Severe obesity (Multi)    Vitamin D deficiency    Type 2 diabetes mellitus without complication, without long-term current use of insulin (Multi)    Anxiety    Newly diagnosed diabetes (Multi)    Acne keloid    Costochondritis    Folliculitis keloidalis nuchae    Hematuria    Lightheadedness    Myopic astigmatism    Ocular hypertension    Pseudofolliculitis barbae    Rupture of globe     Past Medical History:   Diagnosis Date    Ocular hypertension, 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    Ocular hypertension of left eye    Ocular laceration without prolapse or loss of intraocular tissue, left eye, initial encounter     Ruptured globe, left eye    Personal history of other diseases of the nervous system and sense organs     History of retinal detachment    Personal history of other diseases of the respiratory system     Personal history of asthma    Unspecified astigmatism, unspecified eye 01/23/2018    Myopic astigmatism    Unspecified corneal scar and opacity 01/23/2018    Cornea scar     Past Surgical History:   Procedure Laterality Date    OTHER SURGICAL HISTORY  05/13/2014    Repair Of Rupture Of Eyeball     Current Outpatient Medications on File Prior to Visit   Medication Sig Dispense Refill    benzoyl peroxide 5 % external wash 1 Application      blood-glucose sensor (Dexcom G7 Sensor) device USE WITH DEXCOM DEVICE FOR CONTINUOUS BLOOD GLUCOSE MANAGEMENT. CHANGE EVERY 10 DAYS. 3 each 6    busPIRone (Buspar) 7.5 mg tablet TAKE 1 TABLET BY MOUTH 2 TIMES A  tablet 1    cholecalciferol (Vitamin D3) 50 MCG (2000 UT) tablet Take 1 tablet (50 mcg) by mouth once daily. (Patient not taking: Reported on 6/3/2024) 90 tablet 3    clindamycin (Cleocin T) 1 % external solution 1 Application      Dexcom G7  misc Use as instructed 1 each 0    dulaglutide (Trulicity) 1.5 mg/0.5 mL pen injector  injection Inject 1.5 mg under the skin 1 (one) time per week. 2 mL 2    FreeStyle glucose monitoring (OneTouch Ultra2 Meter) kit USE TO TEST BLOOD SUGAR TWICE DAILY 1 each 0    lancets 30 gauge misc       OneTouch Ultra Test strip Test sugars twice daily 100 strip 11    OneTouch Ultra2 Meter misc USE TO TEST BLOOD SUGAR TWICE DAILY      sertraline (Zoloft) 50 mg tablet Take 1 tablet (50 mg) by mouth once daily.       No current facility-administered medications on file prior to visit.       PHYSICAL EXAMINATION:   General: Awake. Alert. Comfortable. No apparent distress.   Speech: Normal.  Comprehension: Normal.  Mood: Stable.  Affect: Appropriate.  Pul:         Normal respiratory effort.   Neuro: Alert, well-oriented.    ASSESSMENT AND PLAN: Mr. Erasto Botello is a 29 y.o. male with at least mild STEVE on CPAP, who is not yet meeting compliance requirements. He tolerates it well overall, but has been removing it in his sleep. He likely needs more time to acclimate using it and certainly needs to be using it more often to desensitize. No pressure intolerance or air hunger or bothersome mask leak. His respiratory events are essentially eliminated, per device download.     #STEVE  -encouraged pt to use CPAP even before he falls asleep, to use it nightly, to use it even when he naps, and to aim for at least 4 hours of use per day, though he could wait a few days until his nasal congestion from his URI resolves  -reviewed alternate treatment options. Plan to continue CPAP for now    #obesity, class 2  -weight loss encouraged  -pt walking daily    All of the above was discussed with the patient in detail. He voiced an understanding of the above and was agreeable to proceed further as advised.     28 minutes were spent with the patient plus time spent reviewing the chart, updating the chart as needed, and documenting.     FOLLOW UP: Sept 13 for 10:20 AM via video for CPAP compliance check

## 2024-09-09 ENCOUNTER — TELEMEDICINE (OUTPATIENT)
Dept: PHARMACY | Facility: HOSPITAL | Age: 29
End: 2024-09-09
Payer: COMMERCIAL

## 2024-09-09 DIAGNOSIS — E11.9 DIABETES MELLITUS TYPE 2 WITHOUT RETINOPATHY (MULTI): ICD-10-CM

## 2024-09-09 DIAGNOSIS — E11.9 DIABETES MELLITUS TYPE 2 WITHOUT RETINOPATHY (MULTI): Primary | ICD-10-CM

## 2024-09-09 RX ORDER — DULAGLUTIDE 1.5 MG/.5ML
1.5 INJECTION, SOLUTION SUBCUTANEOUS
Refills: 2 | OUTPATIENT
Start: 2024-09-09

## 2024-09-09 RX ORDER — DULAGLUTIDE 1.5 MG/.5ML
1.5 INJECTION, SOLUTION SUBCUTANEOUS
Qty: 6 ML | Refills: 0 | Status: SHIPPED | OUTPATIENT
Start: 2024-09-09

## 2024-09-09 RX ORDER — DULAGLUTIDE 1.5 MG/.5ML
1.5 INJECTION, SOLUTION SUBCUTANEOUS
Qty: 6 ML | Refills: 3 | Status: SHIPPED | OUTPATIENT
Start: 2024-09-09 | End: 2024-09-09 | Stop reason: SDUPTHER

## 2024-09-09 NOTE — PROGRESS NOTES
Clinical Pharmacy Appointment  Subjective     Patient ID: Erasto Botello is a 29 y.o. male who presents for No chief complaint on file..    Referring Provider: Cesar Díaz MD     HPI    Diet: generally healthy per patient- maybe eating more than normal lately    Allergies   Allergen Reactions    Amoxicillin Unknown       Objective     Current DM Pharmacotherapy:   Trulicity 1.5 mg/0.5 mL - once weekly    Clarifications to above regimen: thinks he didn't inject last dose correctly because he didn't bleed and his numbers have been higher- discussed injection technique  Adverse Effects: None    SECONDARY PREVENTION  - Statin? No, not indicated  - ACEi/ARB? No,    - Aspirin? No    Current monitoring regimen:   Patient is using: continuous glucose monitor    Testing frequency: CGM    SMBG Readings: 150-200 mg/dL last 2 days - normally 120-140 mg/dL     Any episodes of hypoglycemia? No.  Did patient treat episode of hypoglycemia appropriately? N/A    Pertinent PMH Review:  - PMH of Pancreatitis: No  - PMH/FH of Medullary Thyroid Cancer: No  - PMH/FH of Multiple Endocrine Neoplasia (MEN) Type II: No  - PMH of Retinopathy: No  - PMH of Urinary Tract Infections/Yeast Infections: No    Lab Review  BMP  Lab Results   Component Value Date    CALCIUM 9.7 05/16/2024     05/16/2024    K 4.2 05/16/2024    CO2 26 05/16/2024     05/16/2024    BUN 13 05/16/2024    CREATININE 0.99 05/16/2024    EGFR >90 05/16/2024     LFTs  Lab Results   Component Value Date    ALT 18 05/16/2024    AST 20 05/16/2024    ALKPHOS 85 05/16/2024    BILITOT 0.4 05/16/2024     FLP  Lab Results   Component Value Date    TRIG 109 02/13/2019    CHOL 120 11/09/2023    LDLF 80 02/13/2019    HDL 26.8 11/09/2023       The ASCVD Risk score (Sonia EDWARDS, et al., 2019) failed to calculate for the following reasons:    The 2019 ASCVD risk score is only valid for ages 40 to 79  Urine Microalbumin  Lab Results   Component Value Date     MICROALBCREA 5.5 05/16/2024     Weight Management  Wt Readings from Last 3 Encounters:   05/23/24 132 kg (291 lb)   05/14/24 133 kg (294 lb)   05/04/24 133 kg (293 lb 11.2 oz)      There is no height or weight on file to calculate BMI.   A1C  Lab Results   Component Value Date    HGBA1C 5.7 (H) 05/16/2024    HGBA1C 5.9 (H) 11/09/2023    HGBA1C 5.8 (A) 06/16/2023         Assessment/Plan     Problem List Items Addressed This Visit       Diabetes mellitus type 2 without retinopathy (Multi)     Patient believes he did not get last dose of Trulicity because he did not bleed and he did not pinch his skin for the injection like he normally does. Discussed that he does not need to pinch skin and that bleeding is not necessary to receive dose. Encouraged not to double up on dose this week and to wait until he is normally due.     CONTINUE  Trulicity 1.5 mg/0.5 mL - once weekly             Type 2 diabetes mellitus, is at goal. Goal A1C: <7%    Follow up: I recommend diabetes care be as needed    Casi Hansen PharmD [facility]  Clinical Pharmacy Specialist, Primary Care     Continue all meds under the continuation of care with the referring provider and clinical pharmacy team

## 2024-09-09 NOTE — ASSESSMENT & PLAN NOTE
Patient believes he did not get last dose of Trulicity because he did not bleed and he did not pinch his skin for the injection like he normally does. Discussed that he does not need to pinch skin and that bleeding is not necessary to receive dose. Encouraged not to double up on dose this week and to wait until he is normally due.     CONTINUE  Trulicity 1.5 mg/0.5 mL - once weekly

## 2024-09-13 ENCOUNTER — APPOINTMENT (OUTPATIENT)
Dept: SLEEP MEDICINE | Facility: CLINIC | Age: 29
End: 2024-09-13
Payer: COMMERCIAL

## 2024-09-17 DIAGNOSIS — E11.9 DIABETES MELLITUS TYPE 2 WITHOUT RETINOPATHY (MULTI): ICD-10-CM

## 2024-09-17 RX ORDER — DULAGLUTIDE 1.5 MG/.5ML
1.5 INJECTION, SOLUTION SUBCUTANEOUS
Qty: 6 ML | Refills: 0 | Status: SHIPPED | OUTPATIENT
Start: 2024-09-17

## 2024-09-17 NOTE — TELEPHONE ENCOUNTER
Rx needs to be at [facility] per patient.    Casi Hansen PharmD [facility]  Clinical Pharmacy Specialist, Primary Care

## 2024-09-19 ENCOUNTER — APPOINTMENT (OUTPATIENT)
Dept: SLEEP MEDICINE | Facility: CLINIC | Age: 29
End: 2024-09-19
Payer: COMMERCIAL

## 2024-09-26 ENCOUNTER — APPOINTMENT (OUTPATIENT)
Dept: SLEEP MEDICINE | Facility: CLINIC | Age: 29
End: 2024-09-26
Payer: COMMERCIAL

## 2024-11-19 DIAGNOSIS — E11.9 DIABETES MELLITUS TYPE 2 WITHOUT RETINOPATHY (MULTI): ICD-10-CM

## 2024-12-04 ENCOUNTER — TELEPHONE (OUTPATIENT)
Dept: PHARMACY | Facility: HOSPITAL | Age: 29
End: 2024-12-04

## 2024-12-04 NOTE — TELEPHONE ENCOUNTER
Patient called stating he no longer has insurance. Has not been on any medications for a couple months. Does have a box of Trulicity at home, unsure what strength it is or how old it is. Advised that if it is not  he can still take them. Advised that if it is 1.5 mg/0.5 mL  that he may experience GI HARRIS with restart. Advised that he can take non- Metformin once daily if BG >180 mg/dL. Encouraged to get new insurance and resume regular care with PCP.     Casi Hansen PharmD [facility]  Clinical Pharmacy Specialist, Primary Care

## 2025-01-16 ENCOUNTER — APPOINTMENT (OUTPATIENT)
Dept: RADIOLOGY | Facility: HOSPITAL | Age: 30
End: 2025-01-16
Payer: COMMERCIAL

## 2025-01-16 ENCOUNTER — APPOINTMENT (OUTPATIENT)
Dept: CARDIOLOGY | Facility: HOSPITAL | Age: 30
End: 2025-01-16
Payer: COMMERCIAL

## 2025-01-16 VITALS
WEIGHT: 295 LBS | HEIGHT: 74 IN | DIASTOLIC BLOOD PRESSURE: 86 MMHG | HEART RATE: 90 BPM | RESPIRATION RATE: 20 BRPM | OXYGEN SATURATION: 97 % | BODY MASS INDEX: 37.86 KG/M2 | TEMPERATURE: 98.2 F | SYSTOLIC BLOOD PRESSURE: 152 MMHG

## 2025-01-16 LAB
ALBUMIN SERPL BCP-MCNC: 4.6 G/DL (ref 3.4–5)
ALP SERPL-CCNC: 79 U/L (ref 33–120)
ALT SERPL W P-5'-P-CCNC: 20 U/L (ref 10–52)
ANION GAP SERPL CALC-SCNC: 10 MMOL/L (ref 10–20)
AST SERPL W P-5'-P-CCNC: 22 U/L (ref 9–39)
BASOPHILS # BLD AUTO: 0.05 X10*3/UL (ref 0–0.1)
BASOPHILS NFR BLD AUTO: 0.4 %
BILIRUB SERPL-MCNC: 0.4 MG/DL (ref 0–1.2)
BUN SERPL-MCNC: 14 MG/DL (ref 6–23)
CALCIUM SERPL-MCNC: 9.6 MG/DL (ref 8.6–10.3)
CARDIAC TROPONIN I PNL SERPL HS: <3 NG/L (ref 0–20)
CHLORIDE SERPL-SCNC: 104 MMOL/L (ref 98–107)
CO2 SERPL-SCNC: 28 MMOL/L (ref 21–32)
CREAT SERPL-MCNC: 1.26 MG/DL (ref 0.5–1.3)
EGFRCR SERPLBLD CKD-EPI 2021: 79 ML/MIN/1.73M*2
EOSINOPHIL # BLD AUTO: 0.21 X10*3/UL (ref 0–0.7)
EOSINOPHIL NFR BLD AUTO: 1.7 %
ERYTHROCYTE [DISTWIDTH] IN BLOOD BY AUTOMATED COUNT: 11.9 % (ref 11.5–14.5)
GLUCOSE BLD MANUAL STRIP-MCNC: 103 MG/DL (ref 74–99)
GLUCOSE SERPL-MCNC: 98 MG/DL (ref 74–99)
HCT VFR BLD AUTO: 48.4 % (ref 41–52)
HGB BLD-MCNC: 16.4 G/DL (ref 13.5–17.5)
IMM GRANULOCYTES # BLD AUTO: 0.02 X10*3/UL (ref 0–0.7)
IMM GRANULOCYTES NFR BLD AUTO: 0.2 % (ref 0–0.9)
LYMPHOCYTES # BLD AUTO: 4.4 X10*3/UL (ref 1.2–4.8)
LYMPHOCYTES NFR BLD AUTO: 36.1 %
MCH RBC QN AUTO: 28.6 PG (ref 26–34)
MCHC RBC AUTO-ENTMCNC: 33.9 G/DL (ref 32–36)
MCV RBC AUTO: 84 FL (ref 80–100)
MONOCYTES # BLD AUTO: 1.15 X10*3/UL (ref 0.1–1)
MONOCYTES NFR BLD AUTO: 9.4 %
NEUTROPHILS # BLD AUTO: 6.35 X10*3/UL (ref 1.2–7.7)
NEUTROPHILS NFR BLD AUTO: 52.2 %
NRBC BLD-RTO: 0 /100 WBCS (ref 0–0)
PLATELET # BLD AUTO: 208 X10*3/UL (ref 150–450)
POTASSIUM SERPL-SCNC: 4.2 MMOL/L (ref 3.5–5.3)
PROT SERPL-MCNC: 7.9 G/DL (ref 6.4–8.2)
RBC # BLD AUTO: 5.74 X10*6/UL (ref 4.5–5.9)
SODIUM SERPL-SCNC: 138 MMOL/L (ref 136–145)
WBC # BLD AUTO: 12.2 X10*3/UL (ref 4.4–11.3)

## 2025-01-16 PROCEDURE — 93005 ELECTROCARDIOGRAM TRACING: CPT

## 2025-01-16 PROCEDURE — 99281 EMR DPT VST MAYX REQ PHY/QHP: CPT | Mod: 25

## 2025-01-16 PROCEDURE — 84484 ASSAY OF TROPONIN QUANT: CPT

## 2025-01-16 PROCEDURE — 80053 COMPREHEN METABOLIC PANEL: CPT

## 2025-01-16 PROCEDURE — 99285 EMERGENCY DEPT VISIT HI MDM: CPT | Mod: 25

## 2025-01-16 PROCEDURE — 71046 X-RAY EXAM CHEST 2 VIEWS: CPT

## 2025-01-16 PROCEDURE — 36415 COLL VENOUS BLD VENIPUNCTURE: CPT

## 2025-01-16 PROCEDURE — 71046 X-RAY EXAM CHEST 2 VIEWS: CPT | Performed by: STUDENT IN AN ORGANIZED HEALTH CARE EDUCATION/TRAINING PROGRAM

## 2025-01-16 PROCEDURE — 82947 ASSAY GLUCOSE BLOOD QUANT: CPT

## 2025-01-16 PROCEDURE — 85025 COMPLETE CBC W/AUTO DIFF WBC: CPT

## 2025-01-16 ASSESSMENT — PAIN SCALES - GENERAL: PAINLEVEL_OUTOF10: 8

## 2025-01-16 ASSESSMENT — PAIN DESCRIPTION - DESCRIPTORS: DESCRIPTORS: ACHING;SQUEEZING;THROBBING

## 2025-01-16 ASSESSMENT — PAIN DESCRIPTION - PAIN TYPE: TYPE: ACUTE PAIN

## 2025-01-16 ASSESSMENT — PAIN - FUNCTIONAL ASSESSMENT: PAIN_FUNCTIONAL_ASSESSMENT: 0-10

## 2025-01-16 ASSESSMENT — PAIN DESCRIPTION - LOCATION: LOCATION: CHEST

## 2025-01-16 ASSESSMENT — PAIN DESCRIPTION - ORIENTATION: ORIENTATION: LEFT

## 2025-01-17 ENCOUNTER — HOSPITAL ENCOUNTER (EMERGENCY)
Facility: HOSPITAL | Age: 30
Discharge: HOME | End: 2025-01-17
Payer: COMMERCIAL

## 2025-01-17 LAB
ATRIAL RATE: 89 BPM
P AXIS: 41 DEGREES
P OFFSET: 210 MS
P ONSET: 155 MS
PR INTERVAL: 138 MS
Q ONSET: 224 MS
QRS COUNT: 15 BEATS
QRS DURATION: 76 MS
QT INTERVAL: 310 MS
QTC CALCULATION(BAZETT): 377 MS
QTC FREDERICIA: 353 MS
R AXIS: 27 DEGREES
T AXIS: 52 DEGREES
T OFFSET: 379 MS
VENTRICULAR RATE: 89 BPM

## 2025-01-17 NOTE — ED TRIAGE NOTES
Pt c/o pain on left shoulder blade then radiates to chest around 8 pm. Pt stated that he has hx of DM.

## 2025-01-17 NOTE — ED TRIAGE NOTES
As provider-in-triage, I performed a medical screening history and physical exam on this patient.  HISTORY OF PRESENT ILLNESS  Patient is a 29-year-old male presenting to the ED with concern for chest pain.  Patient states at 8 PM he had a pain in his left shoulder blade that lasted for 5 minutes.  The pain then migrated to his chest, substernal that also lasted for 5 minutes.  Pain in both locations rated 10/10.  Patient states the pain in his shoulder has resolved but he still has 6/10 pain in his chest.  Denies shortness of breath, cough, wheezing, palpitations, leg pain or swelling, dizziness, fever, chills, congestion, rhinorrhea.  Patient is a diabetic.  Denies high cholesterol, hypertension, smoker, family history of cardiac disease.     PHYSICAL EXAM  Vital Signs reviewed.  Cardiovascular: Regular rate and rhythm. No m/g/r.  Chest pain nonreproducible with palpation.  Respiratory: No respiratory distress. No accessory muscle use. Breath sounds are present and equal b/l. No adventitious sounds.        MDM  Workup initiated. Pt stable pending bed availability and further evaluation. Please see subsequent provider note for further details and disposition.       I evaluated this patient in triage with the RN. Due to the patients complaint labs and or imaging were ordered by myself in an attempt to expedite patient care however I am not participating in care after evaluation. This is a preliminary assessment. Pt does not appear in acute distress at this time. They will have a full evaluation as soon as possible. They will be cared for by another provider who will possibly order more labs, imaging or interventions. Pt did not have a full ROS or PE completed by myself however below is a summary with reasons for orders.  For the remainder of the patient's workup and ED course, please refer to the main ED provider note. We discussed need for diagnostic testing including laboratory studies and imaging.  We also  discussed that patient may be asked to wait in the waiting room while these tests are pending.  They understand that if they choose to leave without having the testing completed or resulted that we cannot rule out acute life threatening illnesses and the risks involved could lead to worsening condition, permanent disability or even death.

## 2025-01-19 ENCOUNTER — HOSPITAL ENCOUNTER (EMERGENCY)
Facility: HOSPITAL | Age: 30
Discharge: HOME | End: 2025-01-19
Attending: EMERGENCY MEDICINE
Payer: COMMERCIAL

## 2025-01-19 ENCOUNTER — APPOINTMENT (OUTPATIENT)
Dept: RADIOLOGY | Facility: HOSPITAL | Age: 30
End: 2025-01-19
Payer: COMMERCIAL

## 2025-01-19 VITALS
SYSTOLIC BLOOD PRESSURE: 138 MMHG | BODY MASS INDEX: 36.57 KG/M2 | RESPIRATION RATE: 18 BRPM | DIASTOLIC BLOOD PRESSURE: 95 MMHG | TEMPERATURE: 98.2 F | HEART RATE: 78 BPM | WEIGHT: 285 LBS | OXYGEN SATURATION: 99 % | HEIGHT: 74 IN

## 2025-01-19 DIAGNOSIS — M54.50 ACUTE BILATERAL LOW BACK PAIN WITHOUT SCIATICA: Primary | ICD-10-CM

## 2025-01-19 LAB
ALBUMIN SERPL BCP-MCNC: 4.2 G/DL (ref 3.4–5)
ALP SERPL-CCNC: 79 U/L (ref 33–120)
ALT SERPL W P-5'-P-CCNC: 19 U/L (ref 10–52)
ANION GAP SERPL CALC-SCNC: 9 MMOL/L (ref 10–20)
APPEARANCE UR: CLEAR
AST SERPL W P-5'-P-CCNC: 18 U/L (ref 9–39)
BASOPHILS # BLD AUTO: 0.04 X10*3/UL (ref 0–0.1)
BASOPHILS NFR BLD AUTO: 0.5 %
BILIRUB SERPL-MCNC: 0.6 MG/DL (ref 0–1.2)
BILIRUB UR STRIP.AUTO-MCNC: NEGATIVE MG/DL
BUN SERPL-MCNC: 11 MG/DL (ref 6–23)
CALCIUM SERPL-MCNC: 9.5 MG/DL (ref 8.6–10.3)
CHLORIDE SERPL-SCNC: 105 MMOL/L (ref 98–107)
CO2 SERPL-SCNC: 30 MMOL/L (ref 21–32)
COLOR UR: ABNORMAL
CREAT SERPL-MCNC: 1.1 MG/DL (ref 0.5–1.3)
EGFRCR SERPLBLD CKD-EPI 2021: >90 ML/MIN/1.73M*2
EOSINOPHIL # BLD AUTO: 0.12 X10*3/UL (ref 0–0.7)
EOSINOPHIL NFR BLD AUTO: 1.4 %
ERYTHROCYTE [DISTWIDTH] IN BLOOD BY AUTOMATED COUNT: 12 % (ref 11.5–14.5)
GLUCOSE SERPL-MCNC: 107 MG/DL (ref 74–99)
GLUCOSE UR STRIP.AUTO-MCNC: NORMAL MG/DL
HCT VFR BLD AUTO: 48.4 % (ref 41–52)
HGB BLD-MCNC: 16.3 G/DL (ref 13.5–17.5)
IMM GRANULOCYTES # BLD AUTO: 0.03 X10*3/UL (ref 0–0.7)
IMM GRANULOCYTES NFR BLD AUTO: 0.3 % (ref 0–0.9)
KETONES UR STRIP.AUTO-MCNC: NEGATIVE MG/DL
LEUKOCYTE ESTERASE UR QL STRIP.AUTO: NEGATIVE
LYMPHOCYTES # BLD AUTO: 2.51 X10*3/UL (ref 1.2–4.8)
LYMPHOCYTES NFR BLD AUTO: 28.4 %
MCH RBC QN AUTO: 28.8 PG (ref 26–34)
MCHC RBC AUTO-ENTMCNC: 33.7 G/DL (ref 32–36)
MCV RBC AUTO: 86 FL (ref 80–100)
MONOCYTES # BLD AUTO: 0.74 X10*3/UL (ref 0.1–1)
MONOCYTES NFR BLD AUTO: 8.4 %
NEUTROPHILS # BLD AUTO: 5.39 X10*3/UL (ref 1.2–7.7)
NEUTROPHILS NFR BLD AUTO: 61 %
NITRITE UR QL STRIP.AUTO: NEGATIVE
NRBC BLD-RTO: 0 /100 WBCS (ref 0–0)
PH UR STRIP.AUTO: 5.5 [PH]
PLATELET # BLD AUTO: 203 X10*3/UL (ref 150–450)
POTASSIUM SERPL-SCNC: 4.8 MMOL/L (ref 3.5–5.3)
PROT SERPL-MCNC: 7.7 G/DL (ref 6.4–8.2)
PROT UR STRIP.AUTO-MCNC: NEGATIVE MG/DL
RBC # BLD AUTO: 5.65 X10*6/UL (ref 4.5–5.9)
RBC # UR STRIP.AUTO: ABNORMAL /UL
RBC #/AREA URNS AUTO: NORMAL /HPF
SODIUM SERPL-SCNC: 139 MMOL/L (ref 136–145)
SP GR UR STRIP.AUTO: 1.03
UROBILINOGEN UR STRIP.AUTO-MCNC: NORMAL MG/DL
WBC # BLD AUTO: 8.8 X10*3/UL (ref 4.4–11.3)
WBC #/AREA URNS AUTO: NORMAL /HPF

## 2025-01-19 PROCEDURE — 80053 COMPREHEN METABOLIC PANEL: CPT | Performed by: EMERGENCY MEDICINE

## 2025-01-19 PROCEDURE — 36415 COLL VENOUS BLD VENIPUNCTURE: CPT | Performed by: EMERGENCY MEDICINE

## 2025-01-19 PROCEDURE — 81001 URINALYSIS AUTO W/SCOPE: CPT | Performed by: EMERGENCY MEDICINE

## 2025-01-19 PROCEDURE — 2500000001 HC RX 250 WO HCPCS SELF ADMINISTERED DRUGS (ALT 637 FOR MEDICARE OP)

## 2025-01-19 PROCEDURE — 74176 CT ABD & PELVIS W/O CONTRAST: CPT

## 2025-01-19 PROCEDURE — 2500000005 HC RX 250 GENERAL PHARMACY W/O HCPCS

## 2025-01-19 PROCEDURE — 74176 CT ABD & PELVIS W/O CONTRAST: CPT | Performed by: RADIOLOGY

## 2025-01-19 PROCEDURE — 96374 THER/PROPH/DIAG INJ IV PUSH: CPT

## 2025-01-19 PROCEDURE — 99284 EMERGENCY DEPT VISIT MOD MDM: CPT | Mod: 25 | Performed by: EMERGENCY MEDICINE

## 2025-01-19 PROCEDURE — 2500000004 HC RX 250 GENERAL PHARMACY W/ HCPCS (ALT 636 FOR OP/ED)

## 2025-01-19 PROCEDURE — 85025 COMPLETE CBC W/AUTO DIFF WBC: CPT | Performed by: EMERGENCY MEDICINE

## 2025-01-19 RX ORDER — IBUPROFEN 600 MG/1
600 TABLET ORAL EVERY 6 HOURS PRN
Qty: 28 TABLET | Refills: 0 | Status: SHIPPED | OUTPATIENT
Start: 2025-01-19 | End: 2025-01-26

## 2025-01-19 RX ORDER — METHOCARBAMOL 500 MG/1
500 TABLET, FILM COATED ORAL 2 TIMES DAILY
Qty: 20 TABLET | Refills: 0 | Status: SHIPPED | OUTPATIENT
Start: 2025-01-19 | End: 2025-01-29

## 2025-01-19 RX ORDER — ACETAMINOPHEN 325 MG/1
650 TABLET ORAL EVERY 6 HOURS PRN
Qty: 60 TABLET | Refills: 0 | Status: SHIPPED | OUTPATIENT
Start: 2025-01-19 | End: 2025-02-02

## 2025-01-19 RX ORDER — KETOROLAC TROMETHAMINE 30 MG/ML
15 INJECTION, SOLUTION INTRAMUSCULAR; INTRAVENOUS ONCE
Status: COMPLETED | OUTPATIENT
Start: 2025-01-19 | End: 2025-01-19

## 2025-01-19 RX ORDER — METHOCARBAMOL 500 MG/1
1000 TABLET, FILM COATED ORAL ONCE
Status: COMPLETED | OUTPATIENT
Start: 2025-01-19 | End: 2025-01-19

## 2025-01-19 RX ORDER — LIDOCAINE 560 MG/1
1 PATCH PERCUTANEOUS; TOPICAL; TRANSDERMAL ONCE
Status: DISCONTINUED | OUTPATIENT
Start: 2025-01-19 | End: 2025-01-19 | Stop reason: HOSPADM

## 2025-01-19 RX ADMIN — KETOROLAC TROMETHAMINE 15 MG: 30 INJECTION, SOLUTION INTRAMUSCULAR at 12:53

## 2025-01-19 RX ADMIN — METHOCARBAMOL TABLETS 1000 MG: 500 TABLET, COATED ORAL at 12:52

## 2025-01-19 RX ADMIN — LIDOCAINE 4% 1 PATCH: 40 PATCH TOPICAL at 12:53

## 2025-01-19 ASSESSMENT — PAIN SCALES - GENERAL
PAINLEVEL_OUTOF10: 4
PAINLEVEL_OUTOF10: 4
PAINLEVEL_OUTOF10: 6
PAINLEVEL_OUTOF10: 2

## 2025-01-19 ASSESSMENT — PAIN DESCRIPTION - PAIN TYPE
TYPE: ACUTE PAIN
TYPE: ACUTE PAIN

## 2025-01-19 ASSESSMENT — PAIN DESCRIPTION - LOCATION
LOCATION: BACK
LOCATION: BACK

## 2025-01-19 ASSESSMENT — COLUMBIA-SUICIDE SEVERITY RATING SCALE - C-SSRS
1. IN THE PAST MONTH, HAVE YOU WISHED YOU WERE DEAD OR WISHED YOU COULD GO TO SLEEP AND NOT WAKE UP?: NO
2. HAVE YOU ACTUALLY HAD ANY THOUGHTS OF KILLING YOURSELF?: NO
6. HAVE YOU EVER DONE ANYTHING, STARTED TO DO ANYTHING, OR PREPARED TO DO ANYTHING TO END YOUR LIFE?: NO

## 2025-01-19 ASSESSMENT — PAIN - FUNCTIONAL ASSESSMENT
PAIN_FUNCTIONAL_ASSESSMENT: 0-10
PAIN_FUNCTIONAL_ASSESSMENT: 0-10

## 2025-01-19 NOTE — DISCHARGE INSTRUCTIONS
Please follow-up with your primary care physician within the next 3 to 5 days.  If you develop severe abdominal pain, nausea, vomiting, fevers, chills, difficulty urinating, blood in your urine please return to the ED.  I prescribed Tylenol, Advil and Robaxin for you to take for your back pain.  Robaxin may make you a little sleepy because it is a muscle relaxer, use cautiously.    Follow-up with your primary care doctor as needed.  If you do not have a primary care doctor you may call [phone] to make an appointment.    Your CT scan results are below:  IMPRESSION:  No CT evidence for acute pathology within the abdomen or pelvis.

## 2025-01-19 NOTE — ED NOTES
Pt. To room 8b. Assumed care at this time. Pt. Placed on continuous oxygen monitoring and cycling blood pressure. Blood and urine collected, awaiting results.      Asia Nash RN  01/19/25 [id number]

## 2025-01-19 NOTE — ED PROVIDER NOTES
CC: Flank Pain and Back Pain     History provided by: Patient  Limitations to History: None    HPI:  Patient is a 29-year-old male with history of type 2 diabetes on Trulicity who presents with lower back pain.  He states he has had ongoing bilateral lower back pain since last Sunday.  He denies any trauma, falls but does state he has a strenuous job.  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  He denies any radiation of his pain.  Notes lower back pain is worse on the right side however is present to the left.  The pain does not radiate down his legs.  He denies any associated chest pain, abdominal pain.  He denies any urinary complaints, history of kidney stones, testicular pain or swelling.  Denies any fevers, chills, history of IV drug use, malignancy, abnormal weight loss.  He denies any numbness, tingling, weakness.  He is ambulatory with steady gait. He has not taken pain medications at home today.    External Records Reviewed:  I reviewed prior ED visits, Care Everywhere, discharge summaries and outpatient records as appropriate.   ???????????????????????????????????????????????????????????????  Triage Vitals:  T 36.8 °C (98.2 °F)  HR 85  BP (!) 139/92  RR 18  O2 98 % None (Room air)    Physical Exam  Vitals and nursing note reviewed.   Constitutional:       General: He is not in acute distress.     Appearance: Normal appearance.   HENT:      Head: Normocephalic and atraumatic.   Eyes:      Conjunctiva/sclera: Conjunctivae normal.   Cardiovascular:      Rate and Rhythm: Normal rate and regular rhythm.   Pulmonary:      Effort: Pulmonary effort is normal. No respiratory distress.   Abdominal:      General: Abdomen is flat. There is no distension.      Palpations: Abdomen is soft.      Tenderness: There is no abdominal tenderness. There is no guarding or rebound.   Musculoskeletal:         General: Normal range of motion.       Cervical back: Normal range of motion and neck supple.      Comments: 5 out of 5 strength in bilateral upper and lower extremities, sensation grossly intact and symmetric, no midline C, T, L-spine tenderness to palpation or step-off, reproducible lower lumbar paravertebral tenderness, no obvious bruising or open wounds, no CVA tenderness bilaterally   Skin:     General: Skin is warm and dry.   Neurological:      General: No focal deficit present.      Mental Status: He is alert and oriented to person, place, and time. Mental status is at baseline.   Psychiatric:         Mood and Affect: Mood normal.         Behavior: Behavior normal.        ???????????????????????????????????????????????????????????????  ED Course/Treatment/Medical Decision Making  MDM:  Patient is a 29-year-old male who presents with lower back pain.  No signs of overt trauma, focal neurologic deficits, midline spinal tenderness to palpation.  No CVA tenderness, fevers, abdominal pain, urinary complaints.  Low suspicion for acute nephrolithiasis, cystitis, intra-abdominal pathology, aortic pathology.  Likely musculoskeletal pathology from overuse versus strain.  Low suspicion for acute spinal infection such as spinal epidural abscess, cord compression, vertebral fracture. Multimodal pain regimen given in ED as well. Patient prescribed multimodal pain regimen as well.      ED Course:  ED Course as of 01/19/25 1817   Sun Jan 19, 2025   1245 I reviewed nursing initiated protocol, CBC, CMP wnl, UA with small amount of blood otherwise wnl, will obtain CT A/P without contrast given trace blood in urine [SA]   1816 CT reviewed:  IMPRESSION:  No CT evidence for acute pathology within the abdomen or pelvis.   [SA]      ED Course User Index  [SA] Jennifer Montgomerywala, DO         Diagnoses as of 01/19/25 1817   Acute bilateral low back pain without sciatica         EKG Interpretation:  See ED Course/Below:    Independent Interpretation of Studies:  I  independently interpreted labs/imaging as stated in ED Course or below.    Differential diagnoses considered include but are not limited to: See MDM/Below:    Social Determinants Limiting Care:  None identified The following actions were taken to address these social determinants:      Discussion of Management with Other Providers: See MDM/Below:    Disposition:  Discussed differential and workup results including pertinent labs/imaging and any incidental findings if applicable. Patient will follow-up with the primary physician in the next 2-3 days. Return if worse. They understand return precautions and discharge instructions. They were given the opportunity to ask any questions. All of their questions were answered. Patient and family/friend/caregiver are in agreement with this plan.       KAYLIN Menon, PGY-3    I reviewed the case with the attending ED physician. The attending ED physician agrees with the plan. Patient and/or patient´s representative was counseled regarding labs, imaging, likely diagnosis, and plan. All questions were answered.    Disclaimer: This note was dictated by speech recognition.  Attempt at proofreading was made to minimize errors.  Errors in transcription may be present.  Please call if questions.    Procedures ? SmartLinks last updated 1/19/2025 6:17 PM        Jennifer Moss DO  Resident  01/19/25 [id number]

## 2025-06-11 ENCOUNTER — HOSPITAL ENCOUNTER (EMERGENCY)
Facility: HOSPITAL | Age: 30
Discharge: HOME | End: 2025-06-11
Attending: EMERGENCY MEDICINE

## 2025-06-11 ENCOUNTER — APPOINTMENT (OUTPATIENT)
Dept: RADIOLOGY | Facility: HOSPITAL | Age: 30
End: 2025-06-11

## 2025-06-11 VITALS
SYSTOLIC BLOOD PRESSURE: 127 MMHG | TEMPERATURE: 98.6 F | WEIGHT: 293 LBS | HEIGHT: 74 IN | OXYGEN SATURATION: 100 % | RESPIRATION RATE: 16 BRPM | HEART RATE: 75 BPM | DIASTOLIC BLOOD PRESSURE: 78 MMHG | BODY MASS INDEX: 37.6 KG/M2

## 2025-06-11 DIAGNOSIS — R29.810 FACIAL DROOP: Primary | ICD-10-CM

## 2025-06-11 DIAGNOSIS — R90.89 ABNORMAL BRAIN CT: ICD-10-CM

## 2025-06-11 LAB
ALBUMIN SERPL BCP-MCNC: 4.5 G/DL (ref 3.4–5)
ALP SERPL-CCNC: 77 U/L (ref 33–120)
ALT SERPL W P-5'-P-CCNC: 25 U/L (ref 10–52)
ANION GAP SERPL CALC-SCNC: 13 MMOL/L (ref 10–20)
AST SERPL W P-5'-P-CCNC: 19 U/L (ref 9–39)
BASOPHILS # BLD AUTO: 0.03 X10*3/UL (ref 0–0.1)
BASOPHILS NFR BLD AUTO: 0.2 %
BILIRUB SERPL-MCNC: 0.5 MG/DL (ref 0–1.2)
BUN SERPL-MCNC: 10 MG/DL (ref 6–23)
CALCIUM SERPL-MCNC: 9.6 MG/DL (ref 8.6–10.3)
CHLORIDE SERPL-SCNC: 106 MMOL/L (ref 98–107)
CO2 SERPL-SCNC: 25 MMOL/L (ref 21–32)
CREAT SERPL-MCNC: 0.96 MG/DL (ref 0.5–1.3)
EGFRCR SERPLBLD CKD-EPI 2021: >90 ML/MIN/1.73M*2
EOSINOPHIL # BLD AUTO: 0.08 X10*3/UL (ref 0–0.7)
EOSINOPHIL NFR BLD AUTO: 0.6 %
ERYTHROCYTE [DISTWIDTH] IN BLOOD BY AUTOMATED COUNT: 11.9 % (ref 11.5–14.5)
GLUCOSE BLD MANUAL STRIP-MCNC: 124 MG/DL (ref 74–99)
GLUCOSE SERPL-MCNC: 100 MG/DL (ref 74–99)
HCT VFR BLD AUTO: 49.7 % (ref 41–52)
HGB BLD-MCNC: 16.5 G/DL (ref 13.5–17.5)
IMM GRANULOCYTES # BLD AUTO: 0.06 X10*3/UL (ref 0–0.7)
IMM GRANULOCYTES NFR BLD AUTO: 0.5 % (ref 0–0.9)
LYMPHOCYTES # BLD AUTO: 2.8 X10*3/UL (ref 1.2–4.8)
LYMPHOCYTES NFR BLD AUTO: 21.2 %
MCH RBC QN AUTO: 28.7 PG (ref 26–34)
MCHC RBC AUTO-ENTMCNC: 33.2 G/DL (ref 32–36)
MCV RBC AUTO: 86 FL (ref 80–100)
MONOCYTES # BLD AUTO: 0.87 X10*3/UL (ref 0.1–1)
MONOCYTES NFR BLD AUTO: 6.6 %
NEUTROPHILS # BLD AUTO: 9.36 X10*3/UL (ref 1.2–7.7)
NEUTROPHILS NFR BLD AUTO: 70.9 %
NRBC BLD-RTO: 0 /100 WBCS (ref 0–0)
PLATELET # BLD AUTO: 215 X10*3/UL (ref 150–450)
POTASSIUM SERPL-SCNC: 4.6 MMOL/L (ref 3.5–5.3)
PROT SERPL-MCNC: 7.4 G/DL (ref 6.4–8.2)
RBC # BLD AUTO: 5.75 X10*6/UL (ref 4.5–5.9)
SODIUM SERPL-SCNC: 139 MMOL/L (ref 136–145)
WBC # BLD AUTO: 13.2 X10*3/UL (ref 4.4–11.3)

## 2025-06-11 PROCEDURE — 80053 COMPREHEN METABOLIC PANEL: CPT | Performed by: PHYSICIAN ASSISTANT

## 2025-06-11 PROCEDURE — 2500000004 HC RX 250 GENERAL PHARMACY W/ HCPCS (ALT 636 FOR OP/ED): Performed by: PHYSICIAN ASSISTANT

## 2025-06-11 PROCEDURE — 82947 ASSAY GLUCOSE BLOOD QUANT: CPT

## 2025-06-11 PROCEDURE — 85025 COMPLETE CBC W/AUTO DIFF WBC: CPT | Performed by: PHYSICIAN ASSISTANT

## 2025-06-11 PROCEDURE — 70450 CT HEAD/BRAIN W/O DYE: CPT | Performed by: INTERNAL MEDICINE

## 2025-06-11 PROCEDURE — 96372 THER/PROPH/DIAG INJ SC/IM: CPT | Performed by: PHYSICIAN ASSISTANT

## 2025-06-11 PROCEDURE — 99284 EMERGENCY DEPT VISIT MOD MDM: CPT | Mod: 25 | Performed by: EMERGENCY MEDICINE

## 2025-06-11 PROCEDURE — 2500000001 HC RX 250 WO HCPCS SELF ADMINISTERED DRUGS (ALT 637 FOR MEDICARE OP): Performed by: PHYSICIAN ASSISTANT

## 2025-06-11 PROCEDURE — 70450 CT HEAD/BRAIN W/O DYE: CPT

## 2025-06-11 PROCEDURE — 36415 COLL VENOUS BLD VENIPUNCTURE: CPT | Performed by: PHYSICIAN ASSISTANT

## 2025-06-11 RX ORDER — HALOPERIDOL LACTATE 5 MG/ML
2 INJECTION, SOLUTION INTRAMUSCULAR ONCE
Status: DISCONTINUED | OUTPATIENT
Start: 2025-06-11 | End: 2025-06-11

## 2025-06-11 RX ORDER — LORAZEPAM 1 MG/1
1 TABLET ORAL ONCE
Status: COMPLETED | OUTPATIENT
Start: 2025-06-11 | End: 2025-06-11

## 2025-06-11 RX ORDER — IBUPROFEN 600 MG/1
600 TABLET, FILM COATED ORAL ONCE
Status: COMPLETED | OUTPATIENT
Start: 2025-06-11 | End: 2025-06-11

## 2025-06-11 RX ORDER — ACETAMINOPHEN 325 MG/1
975 TABLET ORAL ONCE
Status: COMPLETED | OUTPATIENT
Start: 2025-06-11 | End: 2025-06-11

## 2025-06-11 RX ORDER — HALOPERIDOL LACTATE 5 MG/ML
5 INJECTION, SOLUTION INTRAMUSCULAR ONCE
Status: COMPLETED | OUTPATIENT
Start: 2025-06-11 | End: 2025-06-11

## 2025-06-11 RX ADMIN — HALOPERIDOL LACTATE 5 MG: 5 INJECTION, SOLUTION INTRAMUSCULAR at 17:47

## 2025-06-11 RX ADMIN — LORAZEPAM 1 MG: 1 TABLET ORAL at 16:26

## 2025-06-11 RX ADMIN — IBUPROFEN 600 MG: 600 TABLET ORAL at 16:26

## 2025-06-11 RX ADMIN — ACETAMINOPHEN 975 MG: 325 TABLET, FILM COATED ORAL at 16:26

## 2025-06-11 ASSESSMENT — PAIN SCALES - GENERAL: PAINLEVEL_OUTOF10: 7

## 2025-06-11 ASSESSMENT — PAIN DESCRIPTION - PAIN TYPE: TYPE: ACUTE PAIN

## 2025-06-11 ASSESSMENT — PAIN - FUNCTIONAL ASSESSMENT: PAIN_FUNCTIONAL_ASSESSMENT: 0-10

## 2025-06-11 ASSESSMENT — PAIN DESCRIPTION - ORIENTATION: ORIENTATION: RIGHT

## 2025-06-11 ASSESSMENT — PAIN DESCRIPTION - LOCATION: LOCATION: FACE

## 2025-06-11 NOTE — ED PROVIDER NOTES
HPI     CC: Earache and Facial Droop     HPI: Erasto Botello is a 30 y.o. 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  Patient reports that a few days ago, he had some congestion and runny nose which she attributed to sleeping under a fan.  Also has had some diarrhea.  Then developed left ear pain which she thought may be an ear infection.  Last night when he woke up from a nap around 6 PM, he felt like the right side of his face felt slightly strange.  He went to urgent care today and they noted that the left side of his face was drooping, so sent for evaluation.  Diagnosed Bell's palsy but wanted confirmation with CT scan.  Patient reports no significant headaches, vision changes, hearing loss, neck pain, fevers, chills, or history of incidents like this.  Reports no extremity changes, confusion, weakness.    ROS: 10-point review of systems was performed and is otherwise negative except as noted in HPI.      Past Medical History: Noncontributory except per HPI     Past Surgical History: Noncontributory except per HPI     Family History: Reviewed and noncontributory     Social History:  Noncontributory except per HPI       RX Allergies[1]    Medical History[2]    Home Meds:   Current Outpatient Medications   Medication Instructions    benzoyl peroxide 5 % external wash 1 Application    blood-glucose sensor (Dexcom G7 Sensor) device USE WITH DEXCOM DEVICE FOR CONTINUOUS BLOOD GLUCOSE MANAGEMENT. CHANGE EVERY 10 DAYS.    busPIRone (BUSPAR) 7.5 mg, oral, 2 times daily    clindamycin (Cleocin T) 1 % external solution 1 Application    Dexcom G7  misc Use as instructed    FreeStyle glucose monitoring (OneTouch Ultra2 Meter) kit USE TO TEST BLOOD SUGAR TWICE DAILY    lancets 30 gauge misc No dose, route, or frequency recorded.    methocarbamol (ROBAXIN) 500 mg, oral, 2  "times daily    OneTouch Ultra Test strip Test sugars twice daily    OneTouch Ultra2 Meter misc USE TO TEST BLOOD SUGAR TWICE DAILY    sertraline (ZOLOFT) 50 mg, oral, Daily, Not reliably taking this    Trulicity 1.5 mg, subcutaneous, Once Weekly        ED Triage Vitals [06/11/25 1057]   Temperature Heart Rate Respirations BP   37 °C (98.6 °F) 90 18 (!) 164/96      Pulse Ox Temp src Heart Rate Source Patient Position   98 % -- -- --      BP Location FiO2 (%)     -- --         Heart Rate:  [75-90]   Temperature:  [37 °C (98.6 °F)]   Respirations:  [16-18]   BP: (127-164)/(78-96)   Height:  [188 cm (6' 2\")]   Weight:  [133 kg (293 lb)]   Pulse Ox:  [98 %-100 %]      Physical Exam:  Physical Exam  Vitals and nursing note reviewed.   Constitutional:       General: He is not in acute distress.     Appearance: Normal appearance. He is not ill-appearing.   HENT:      Head: Normocephalic and atraumatic.      Right Ear: Tympanic membrane and external ear normal.      Left Ear: Tympanic membrane and external ear normal.      Nose: Nose normal.      Mouth/Throat:      Mouth: Mucous membranes are moist.   Eyes:      Extraocular Movements: Extraocular movements intact.      Conjunctiva/sclera: Conjunctivae normal.      Pupils: Pupils are equal, round, and reactive to light.   Cardiovascular:      Rate and Rhythm: Normal rate and regular rhythm.   Pulmonary:      Effort: Pulmonary effort is normal.   Abdominal:      General: Abdomen is flat. There is no distension.   Musculoskeletal:         General: Normal range of motion.      Cervical back: Normal range of motion and neck supple.   Skin:     General: Skin is warm and dry.   Neurological:      General: No focal deficit present.      Mental Status: He is alert and oriented to person, place, and time.      Comments: Left-sided facial droop which is minor when patient smiles and shows teeth, frowns, and raises eyebrows.  Left eyebrow does move slightly but not as far as right.  " Able to close eyes with symmetric skin folds.  Sensation minimally diminished over the mandible but normal along the rest of the face.  No extremity symptoms.   Psychiatric:         Mood and Affect: Mood normal.          Diagnostic Results        Labs Reviewed   CBC WITH AUTO DIFFERENTIAL - Abnormal       Result Value    WBC 13.2 (*)     nRBC 0.0      RBC 5.75      Hemoglobin 16.5      Hematocrit 49.7      MCV 86      MCH 28.7      MCHC 33.2      RDW 11.9      Platelets 215      Neutrophils % 70.9      Immature Granulocytes %, Automated 0.5      Lymphocytes % 21.2      Monocytes % 6.6      Eosinophils % 0.6      Basophils % 0.2      Neutrophils Absolute 9.36 (*)     Immature Granulocytes Absolute, Automated 0.06      Lymphocytes Absolute 2.80      Monocytes Absolute 0.87      Eosinophils Absolute 0.08      Basophils Absolute 0.03     COMPREHENSIVE METABOLIC PANEL - Abnormal    Glucose 100 (*)     Sodium 139      Potassium 4.6      Chloride 106      Bicarbonate 25      Anion Gap 13      Urea Nitrogen 10      Creatinine 0.96      eGFR >90      Calcium 9.6      Albumin 4.5      Alkaline Phosphatase 77      Total Protein 7.4      AST 19      Bilirubin, Total 0.5      ALT 25     POCT GLUCOSE - Abnormal    POCT Glucose 124 (*)          CT head wo IV contrast   Final Result   1. Equivocal focal hypodensity is seen centered upon the midline and   left paramidline monica, seen best in the coronal and sagittal images.   Although findings could be artifactual, given the origin of the   facial nerve in this region, further evaluation with MRI brain   without and with contrast is recommended to exclude underlying lesion   mimicking Bell's palsy.   2. Shrunken left orbit with marked dystrophic calcifications and   enopthalmos.        Findings were communicated with NANCY ANN by Dr. Jessica Riggs   via epic secure chat at 2:06 pm on 6/11/2025 with response   verification.        MACRO:   None        Signed by: Jessica Riggs  6/11/2025 2:09 PM   Dictation workstation:   [id number]      MR brain w and wo IV contrast    (Results Pending)                 Mychal Coma Scale Score: 15         NIH Stroke Scale: 3          Procedure  Procedures    ED Course & MDM   Assessment/Plan:     Medications   acetaminophen (Tylenol) tablet 975 mg (975 mg oral Given 6/11/25 1626)   ibuprofen tablet 600 mg (600 mg oral Given 6/11/25 1626)   LORazepam (Ativan) tablet 1 mg (1 mg oral Given 6/11/25 1626)   haloperidol lactate (Haldol) injection 5 mg (5 mg intramuscular Given 6/11/25 1747)        Diagnoses as of 06/11/25 1925   Facial droop   Abnormal brain CT         Medical Decision Making    Erasto Botello is a 30 y.o. 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  Patient is nontoxic-appearing and vital signs are normal.  NIH score of 3 based on facial symptoms and slight sensory change.  Discussed the case with my attending.  Will obtain CT head to rule out stroke however higher suspicion for Bell's palsy in differential due to recent viral illness and incomplete facial paralysis.  Additional differential include nerve injury, otitis media, otitis externa, mastoiditis, soft tissue mass or infection.  Patient's chart was reviewed including PCP visit from 7/16/2024.  CT scan showed hypodensity in the midline and left paramidline monica which could be artifactual but could be related to an underlying lesion.  For this reason, labs are ordered as well as MRI with and without contrast per radiology recommendations.  Patient was updated.  Seen with Dr. Gautam.  CBC with leukocytosis of 13.2 with left shift.  Glucose elevated slightly at 124.  CMP normal.  Patient was not tolerating MRI brain due to claustrophobia.  Initially ordered for Tylenol, Motrin, and 1 mg of oral Ativan.  He was still unable to tolerate, so 5  mg IM Haldol were ordered.  Patient was still unable to complete MRI and then refused a third attempt despite being offered more medications.  He was advised that there could be some sort of lesion on his brain causing the symptoms of Bell's palsy however he states that he will get this done as an outpatient.  I explained that he will need to see an outpatient provider to obtain the order as he will not be able to take the ER order as an outpatient.  Patient is fine with this.  We discussed there is potential for his symptoms to get worse however we do suspect them to improve if it is a true Bell's palsy.  Strongly advised the patient return for any new or worsening symptoms.  Given follow-up with primary care and neurology.    Seen with Dr. Gautam    Disposition: Home    ED Prescriptions    None         Social Determinants Affecting Care: none     Tiffany Gregg PA-C    This note was dictated by speech recognition. Minor errors in transcription may be present.       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

## 2025-06-11 NOTE — ED TRIAGE NOTES
Pain in back of head started Monday night, has some ear pain, patient has pain left side in the back. Patient has facial droop on left side. Patient went to urgent care was diagnosed with bells palsy at urgent care. Patient came to ED for second opinion.

## 2025-06-12 ENCOUNTER — TELEPHONE (OUTPATIENT)
Dept: PRIMARY CARE | Facility: CLINIC | Age: 30
End: 2025-06-12

## 2025-06-12 DIAGNOSIS — G52.9 CRANIAL NERVE PALSY: Primary | ICD-10-CM

## 2025-06-12 NOTE — TELEPHONE ENCOUNTER
Pt's mother called 6/12/25 stating that pt needs a STAT MRI because something was seen on the pt CT of the head.. Pt needs an order for an open MRI because he's claustrophobic.  The pt's last ov was 5/14/24. You can contact the pt if need be at.

## 2025-06-16 ENCOUNTER — HOSPITAL ENCOUNTER (OUTPATIENT)
Dept: RADIOLOGY | Facility: CLINIC | Age: 30
Discharge: HOME | End: 2025-06-16

## 2025-06-16 DIAGNOSIS — G52.9 CRANIAL NERVE PALSY: ICD-10-CM

## 2025-06-16 PROCEDURE — 70553 MRI BRAIN STEM W/O & W/DYE: CPT | Performed by: RADIOLOGY

## 2025-06-16 PROCEDURE — A9575 INJ GADOTERATE MEGLUMI 0.1ML: HCPCS | Performed by: STUDENT IN AN ORGANIZED HEALTH CARE EDUCATION/TRAINING PROGRAM

## 2025-06-16 PROCEDURE — 70553 MRI BRAIN STEM W/O & W/DYE: CPT

## 2025-06-16 PROCEDURE — 2550000001 HC RX 255 CONTRASTS: Performed by: STUDENT IN AN ORGANIZED HEALTH CARE EDUCATION/TRAINING PROGRAM

## 2025-06-16 RX ORDER — GADOTERATE MEGLUMINE 376.9 MG/ML
20 INJECTION INTRAVENOUS
Status: COMPLETED | OUTPATIENT
Start: 2025-06-16 | End: 2025-06-16

## 2025-06-16 RX ADMIN — GADOTERATE MEGLUMINE 20 ML: 376.9 INJECTION INTRAVENOUS at 13:29

## 2025-06-17 DIAGNOSIS — E11.9 DIABETES MELLITUS TYPE 2 WITHOUT RETINOPATHY (MULTI): ICD-10-CM

## 2025-06-17 DIAGNOSIS — G51.0 BELL'S PALSY: Primary | ICD-10-CM

## 2025-06-17 RX ORDER — DULAGLUTIDE 1.5 MG/.5ML
1.5 INJECTION, SOLUTION SUBCUTANEOUS
Qty: 2 ML | Refills: 0 | Status: SHIPPED | OUTPATIENT
Start: 2025-06-17

## 2025-06-17 RX ORDER — PREDNISONE 20 MG/1
60 TABLET ORAL DAILY
Qty: 21 TABLET | Refills: 0 | Status: SHIPPED | OUTPATIENT
Start: 2025-06-17 | End: 2025-06-24

## 2025-06-18 ENCOUNTER — APPOINTMENT (OUTPATIENT)
Dept: RADIOLOGY | Facility: CLINIC | Age: 30
End: 2025-06-18

## 2025-07-21 ENCOUNTER — OFFICE VISIT (OUTPATIENT)
Dept: URGENT CARE | Age: 30
End: 2025-07-21
Payer: MEDICARE

## 2025-07-21 VITALS
HEART RATE: 78 BPM | RESPIRATION RATE: 16 BRPM | SYSTOLIC BLOOD PRESSURE: 119 MMHG | OXYGEN SATURATION: 96 % | TEMPERATURE: 98.5 F | DIASTOLIC BLOOD PRESSURE: 86 MMHG

## 2025-07-21 DIAGNOSIS — L02.219 CELLULITIS AND ABSCESS OF TRUNK: Primary | ICD-10-CM

## 2025-07-21 DIAGNOSIS — L03.319 CELLULITIS AND ABSCESS OF TRUNK: Primary | ICD-10-CM

## 2025-07-21 RX ORDER — DOXYCYCLINE HYCLATE 100 MG
100 TABLET ORAL 2 TIMES DAILY
Qty: 14 TABLET | Refills: 0 | Status: SHIPPED | OUTPATIENT
Start: 2025-07-21 | End: 2025-07-23

## 2025-07-21 NOTE — PROGRESS NOTES
Subjective:  Erasto Botello is a 30 y.o. male in clinic today complaining of a red bump on his stomach x 1 week. Mildly tender to the touch. Possibly a boil, as he does get these occasionally. No drainage. Pt has been applying hot compresses to the skin with good improvement. No recent fever, chills, abdominal pain, myalgias or arthralgias.     Review of Systems:  Pertinent items are noted in HPI.    Allergies:  Patient has no known allergies.      Current Medications:  Medications Ordered Prior to Encounter[1]      Past Medical History:  Medical History[2]      Objective:  /86   Pulse 78   Temp 36.9 °C (98.5 °F) (Oral)   Resp 16   SpO2 96%     General Appearance: Alert, cooperative, no distress, appropriate for age  Head: Normocephalic,without obvious abnormality  Heart: Regular rate & rhythm  Skin/Hair/Nails: Skin warm, dry. Right lower abdomen with small healing abscess with surrounding erythema. Mildly tender to palpation. No fluctuance or active drainage. No streaking.          ASSESSMENT/ PLAN:  1. Cellulitis and abscess of trunk  doxycycline (Vibra-Tabs) 100 mg tablet            Abscess itself looks to be improving, flat without any fluctuance or active drainage  Will start Doxycycline  Continue warm compresses  Return to Urgent care if symptoms return or progress  Follow up with PCP in 1-2 weeks       Stephanie Israel PA-C        I have given the patient instructions regarding his diagnosis, expectations, follow up, and return to the ER precautions. Iexplained to the patient that emergent conditions may arise to return to the immediate care or ER for new, worsening or any persistent conditions. I've explained the importance of following up with his doctor- Cesar Díaz MD - asinstructed. The patient verbalized understanding of the discharge instructions and plan.         [1]   Current Outpatient Medications on File Prior to Visit   Medication Sig Dispense Refill    dulaglutide (Trulicity)  1.5 mg/0.5 mL pen injector injection Inject 1.5 mg under the skin 1 (one) time per week. 2 mL 0    lancets 30 gauge misc       benzoyl peroxide 5 % external wash 1 Application (Patient not taking: Reported on 7/21/2025)      blood-glucose sensor (Dexcom G7 Sensor) device USE WITH DEXCOM DEVICE FOR CONTINUOUS BLOOD GLUCOSE MANAGEMENT. CHANGE EVERY 10 DAYS. (Patient not taking: Reported on 7/21/2025) 3 each 6    busPIRone (Buspar) 7.5 mg tablet TAKE 1 TABLET BY MOUTH 2 TIMES A DAY (Patient not taking: Reported on 7/21/2025) 180 tablet 1    clindamycin (Cleocin T) 1 % external solution 1 Application (Patient not taking: Reported on 7/21/2025)      Dexcom G7  misc Use as instructed (Patient not taking: Reported on 7/21/2025) 1 each 0    FreeStyle glucose monitoring (OneTouch Ultra2 Meter) kit USE TO TEST BLOOD SUGAR TWICE DAILY (Patient not taking: Reported on 7/21/2025) 1 each 0    methocarbamol (Robaxin) 500 mg tablet Take 1 tablet (500 mg) by mouth 2 times a day for 10 days. 20 tablet 0    OneTouch Ultra Test strip Test sugars twice daily (Patient not taking: Reported on 7/21/2025) 100 strip 11    OneTouch Ultra2 Meter misc USE TO TEST BLOOD SUGAR TWICE DAILY (Patient not taking: Reported on 7/21/2025)      sertraline (Zoloft) 50 mg tablet Take 1 tablet (50 mg) by mouth once daily. Not reliably taking this (Patient not taking: Reported on 7/21/2025)       No current facility-administered medications on file prior to visit.   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

## 2025-07-23 DIAGNOSIS — E11.9 DIABETES MELLITUS TYPE 2 WITHOUT RETINOPATHY (MULTI): ICD-10-CM

## 2025-07-23 RX ORDER — DOXYCYCLINE HYCLATE 100 MG
100 TABLET ORAL 2 TIMES DAILY
Qty: 14 TABLET | Refills: 0 | Status: SHIPPED | OUTPATIENT
Start: 2025-07-23 | End: 2025-07-30

## 2025-07-23 RX ORDER — DULAGLUTIDE 1.5 MG/.5ML
1.5 INJECTION, SOLUTION SUBCUTANEOUS
Qty: 2 ML | Refills: 0 | Status: SHIPPED | OUTPATIENT
Start: 2025-07-27

## 2025-09-04 ENCOUNTER — LAB REQUISITION (OUTPATIENT)
Dept: LAB | Facility: HOSPITAL | Age: 30
End: 2025-09-04

## 2025-09-04 ENCOUNTER — APPOINTMENT (OUTPATIENT)
Dept: PRIMARY CARE | Facility: CLINIC | Age: 30
End: 2025-09-04

## 2025-09-04 DIAGNOSIS — M79.605 PAIN IN LEFT LEG: ICD-10-CM

## 2025-09-04 LAB — D DIMER PPP FEU-MCNC: 256 NG/ML FEU

## 2025-09-05 LAB
25(OH)D3+25(OH)D2 SERPL-MCNC: 22 NG/ML (ref 30–100)
ALBUMIN SERPL-MCNC: 4.8 G/DL (ref 3.6–5.1)
ALBUMIN/CREAT UR: 9 MG/G CREAT
ALP SERPL-CCNC: 78 U/L (ref 36–130)
ALT SERPL-CCNC: 23 U/L (ref 9–46)
ANION GAP SERPL CALCULATED.4IONS-SCNC: 12 MMOL/L (CALC) (ref 7–17)
AST SERPL-CCNC: 19 U/L (ref 10–40)
BASOPHILS # BLD AUTO: 45 CELLS/UL (ref 0–200)
BASOPHILS NFR BLD AUTO: 0.4 %
BILIRUB SERPL-MCNC: 0.7 MG/DL (ref 0.2–1.2)
BUN SERPL-MCNC: 14 MG/DL (ref 7–25)
CALCIUM SERPL-MCNC: 9.8 MG/DL (ref 8.6–10.3)
CHLORIDE SERPL-SCNC: 102 MMOL/L (ref 98–110)
CHOLEST SERPL-MCNC: 166 MG/DL
CHOLEST/HDLC SERPL: 4 (CALC)
CO2 SERPL-SCNC: 25 MMOL/L (ref 20–32)
CREAT SERPL-MCNC: 1.04 MG/DL (ref 0.6–1.26)
CREAT UR-MCNC: 359 MG/DL (ref 20–320)
EGFRCR SERPLBLD CKD-EPI 2021: 99 ML/MIN/1.73M2
EOSINOPHIL # BLD AUTO: 78 CELLS/UL (ref 15–500)
EOSINOPHIL NFR BLD AUTO: 0.7 %
ERYTHROCYTE [DISTWIDTH] IN BLOOD BY AUTOMATED COUNT: 13.9 % (ref 11–15)
EST. AVERAGE GLUCOSE BLD GHB EST-MCNC: 128 MG/DL
EST. AVERAGE GLUCOSE BLD GHB EST-SCNC: 7.1 MMOL/L
GLUCOSE SERPL-MCNC: 97 MG/DL (ref 65–99)
HBA1C MFR BLD: 6.1 %
HCT VFR BLD AUTO: 53.8 % (ref 38.5–50)
HDLC SERPL-MCNC: 42 MG/DL
HGB BLD-MCNC: 17.9 G/DL (ref 13.2–17.1)
LDLC SERPL CALC-MCNC: 101 MG/DL (CALC)
LYMPHOCYTES # BLD AUTO: 2554 CELLS/UL (ref 850–3900)
LYMPHOCYTES NFR BLD AUTO: 22.8 %
MAGNESIUM SERPL-MCNC: 2 MG/DL (ref 1.5–2.5)
MCH RBC QN AUTO: 29.6 PG (ref 27–33)
MCHC RBC AUTO-ENTMCNC: 33.3 G/DL (ref 32–36)
MCV RBC AUTO: 88.9 FL (ref 80–100)
MICROALBUMIN UR-MCNC: 3.1 MG/DL
MONOCYTES # BLD AUTO: 918 CELLS/UL (ref 200–950)
MONOCYTES NFR BLD AUTO: 8.2 %
NEUTROPHILS # BLD AUTO: 7605 CELLS/UL (ref 1500–7800)
NEUTROPHILS NFR BLD AUTO: 67.9 %
NONHDLC SERPL-MCNC: 124 MG/DL (CALC)
PHOSPHATE SERPL-MCNC: 3.7 MG/DL (ref 2.5–4.5)
PLATELET # BLD AUTO: 204 THOUSAND/UL (ref 140–400)
PMV BLD REES-ECKER: 10.6 FL (ref 7.5–12.5)
POTASSIUM SERPL-SCNC: 4 MMOL/L (ref 3.5–5.3)
PROT SERPL-MCNC: 8.1 G/DL (ref 6.1–8.1)
RBC # BLD AUTO: 6.05 MILLION/UL (ref 4.2–5.8)
SODIUM SERPL-SCNC: 139 MMOL/L (ref 135–146)
TRIGL SERPL-MCNC: 135 MG/DL
TSH SERPL-ACNC: 0.7 MIU/L (ref 0.4–4.5)
WBC # BLD AUTO: 11.2 THOUSAND/UL (ref 3.8–10.8)

## 2026-01-06 ENCOUNTER — APPOINTMENT (OUTPATIENT)
Dept: PRIMARY CARE | Facility: CLINIC | Age: 31
End: 2026-01-06
Payer: MEDICARE

## 2026-09-04 ENCOUNTER — APPOINTMENT (OUTPATIENT)
Dept: PRIMARY CARE | Facility: CLINIC | Age: 31
End: 2026-09-04
Payer: MEDICARE